# Patient Record
Sex: FEMALE | Race: OTHER | HISPANIC OR LATINO | URBAN - METROPOLITAN AREA
[De-identification: names, ages, dates, MRNs, and addresses within clinical notes are randomized per-mention and may not be internally consistent; named-entity substitution may affect disease eponyms.]

---

## 2021-10-22 ENCOUNTER — EMERGENCY (EMERGENCY)
Facility: HOSPITAL | Age: 71
LOS: 1 days | Discharge: HOME | End: 2021-10-22
Admitting: EMERGENCY MEDICINE
Payer: MEDICAID

## 2021-10-22 VITALS
SYSTOLIC BLOOD PRESSURE: 136 MMHG | OXYGEN SATURATION: 100 % | TEMPERATURE: 98 F | DIASTOLIC BLOOD PRESSURE: 76 MMHG | RESPIRATION RATE: 15 BRPM

## 2021-10-22 VITALS
DIASTOLIC BLOOD PRESSURE: 89 MMHG | SYSTOLIC BLOOD PRESSURE: 147 MMHG | HEART RATE: 80 BPM | OXYGEN SATURATION: 100 % | TEMPERATURE: 98 F | RESPIRATION RATE: 18 BRPM

## 2021-10-22 LAB
ALBUMIN SERPL ELPH-MCNC: 4.8 G/DL — SIGNIFICANT CHANGE UP (ref 3.3–5)
ALP SERPL-CCNC: 58 U/L — SIGNIFICANT CHANGE UP (ref 40–120)
ALT FLD-CCNC: 23 U/L — SIGNIFICANT CHANGE UP (ref 4–33)
ANION GAP SERPL CALC-SCNC: 10 MMOL/L — SIGNIFICANT CHANGE UP (ref 7–14)
APPEARANCE UR: CLEAR — SIGNIFICANT CHANGE UP
AST SERPL-CCNC: 22 U/L — SIGNIFICANT CHANGE UP (ref 4–32)
BASOPHILS # BLD AUTO: 0.04 K/UL — SIGNIFICANT CHANGE UP (ref 0–0.2)
BASOPHILS NFR BLD AUTO: 0.6 % — SIGNIFICANT CHANGE UP (ref 0–2)
BILIRUB SERPL-MCNC: 0.2 MG/DL — SIGNIFICANT CHANGE UP (ref 0.2–1.2)
BILIRUB UR-MCNC: NEGATIVE — SIGNIFICANT CHANGE UP
BUN SERPL-MCNC: 22 MG/DL — SIGNIFICANT CHANGE UP (ref 7–23)
CALCIUM SERPL-MCNC: 9.7 MG/DL — SIGNIFICANT CHANGE UP (ref 8.4–10.5)
CHLORIDE SERPL-SCNC: 101 MMOL/L — SIGNIFICANT CHANGE UP (ref 98–107)
CO2 SERPL-SCNC: 25 MMOL/L — SIGNIFICANT CHANGE UP (ref 22–31)
COLOR SPEC: SIGNIFICANT CHANGE UP
CREAT SERPL-MCNC: 0.92 MG/DL — SIGNIFICANT CHANGE UP (ref 0.5–1.3)
DIFF PNL FLD: NEGATIVE — SIGNIFICANT CHANGE UP
EOSINOPHIL # BLD AUTO: 0.16 K/UL — SIGNIFICANT CHANGE UP (ref 0–0.5)
EOSINOPHIL NFR BLD AUTO: 2.4 % — SIGNIFICANT CHANGE UP (ref 0–6)
GLUCOSE SERPL-MCNC: 90 MG/DL — SIGNIFICANT CHANGE UP (ref 70–99)
GLUCOSE UR QL: NEGATIVE — SIGNIFICANT CHANGE UP
HCT VFR BLD CALC: 42 % — SIGNIFICANT CHANGE UP (ref 34.5–45)
HGB BLD-MCNC: 12.7 G/DL — SIGNIFICANT CHANGE UP (ref 11.5–15.5)
IANC: 3.42 K/UL — SIGNIFICANT CHANGE UP (ref 1.5–8.5)
IMM GRANULOCYTES NFR BLD AUTO: 0.1 % — SIGNIFICANT CHANGE UP (ref 0–1.5)
KETONES UR-MCNC: NEGATIVE — SIGNIFICANT CHANGE UP
LEUKOCYTE ESTERASE UR-ACNC: NEGATIVE — SIGNIFICANT CHANGE UP
LYMPHOCYTES # BLD AUTO: 2.76 K/UL — SIGNIFICANT CHANGE UP (ref 1–3.3)
LYMPHOCYTES # BLD AUTO: 40.6 % — SIGNIFICANT CHANGE UP (ref 13–44)
MCHC RBC-ENTMCNC: 24.6 PG — LOW (ref 27–34)
MCHC RBC-ENTMCNC: 30.2 GM/DL — LOW (ref 32–36)
MCV RBC AUTO: 81.2 FL — SIGNIFICANT CHANGE UP (ref 80–100)
MONOCYTES # BLD AUTO: 0.41 K/UL — SIGNIFICANT CHANGE UP (ref 0–0.9)
MONOCYTES NFR BLD AUTO: 6 % — SIGNIFICANT CHANGE UP (ref 2–14)
NEUTROPHILS # BLD AUTO: 3.42 K/UL — SIGNIFICANT CHANGE UP (ref 1.8–7.4)
NEUTROPHILS NFR BLD AUTO: 50.3 % — SIGNIFICANT CHANGE UP (ref 43–77)
NITRITE UR-MCNC: NEGATIVE — SIGNIFICANT CHANGE UP
NRBC # BLD: 0 /100 WBCS — SIGNIFICANT CHANGE UP
NRBC # FLD: 0 K/UL — SIGNIFICANT CHANGE UP
PH UR: 6.5 — SIGNIFICANT CHANGE UP (ref 5–8)
PLATELET # BLD AUTO: 173 K/UL — SIGNIFICANT CHANGE UP (ref 150–400)
POTASSIUM SERPL-MCNC: 4.7 MMOL/L — SIGNIFICANT CHANGE UP (ref 3.5–5.3)
POTASSIUM SERPL-SCNC: 4.7 MMOL/L — SIGNIFICANT CHANGE UP (ref 3.5–5.3)
PROT SERPL-MCNC: 7.1 G/DL — SIGNIFICANT CHANGE UP (ref 6–8.3)
PROT UR-MCNC: NEGATIVE — SIGNIFICANT CHANGE UP
RBC # BLD: 5.17 M/UL — SIGNIFICANT CHANGE UP (ref 3.8–5.2)
RBC # FLD: 13.1 % — SIGNIFICANT CHANGE UP (ref 10.3–14.5)
SODIUM SERPL-SCNC: 136 MMOL/L — SIGNIFICANT CHANGE UP (ref 135–145)
SP GR SPEC: 1.01 — SIGNIFICANT CHANGE UP (ref 1–1.05)
UROBILINOGEN FLD QL: SIGNIFICANT CHANGE UP
WBC # BLD: 6.8 K/UL — SIGNIFICANT CHANGE UP (ref 3.8–10.5)
WBC # FLD AUTO: 6.8 K/UL — SIGNIFICANT CHANGE UP (ref 3.8–10.5)

## 2021-10-22 PROCEDURE — G1004: CPT

## 2021-10-22 PROCEDURE — 74176 CT ABD & PELVIS W/O CONTRAST: CPT | Mod: 26,ME

## 2021-10-22 PROCEDURE — 99284 EMERGENCY DEPT VISIT MOD MDM: CPT

## 2021-10-22 RX ORDER — LIDOCAINE 4 G/100G
1 CREAM TOPICAL ONCE
Refills: 0 | Status: COMPLETED | OUTPATIENT
Start: 2021-10-22 | End: 2021-10-22

## 2021-10-22 RX ORDER — ACETAMINOPHEN 500 MG
650 TABLET ORAL ONCE
Refills: 0 | Status: COMPLETED | OUTPATIENT
Start: 2021-10-22 | End: 2021-10-22

## 2021-10-22 RX ADMIN — LIDOCAINE 1 PATCH: 4 CREAM TOPICAL at 19:44

## 2021-10-22 RX ADMIN — Medication 650 MILLIGRAM(S): at 19:44

## 2021-10-22 NOTE — ED PROVIDER NOTE - PHYSICAL EXAMINATION
Vital signs reviewed.   CONSTITUTIONAL: Well-appearing; well-nourished; in no apparent distress. Non-toxic appearing.   HEAD: Normocephalic, atraumatic.  NECK Supple; non-tender  CARD: Normal S1, S2, rate regular   RESP: Normal chest excursion with respiration; breath sounds clear and equal bilaterally  ABD/GI: +discomfort to right flank with palpation, abdomen is soft, non-distended; non-tender  EXT/MS: no lumbar or spinal tenderness on exam, ROM full UE and LE, sensation grossly intact, ambulates steadily.   SKIN: Normal for age and race; warm; dry; good turgor; no apparent lesions or exudate noted.  NEURO: Awake, alert, oriented x 3

## 2021-10-22 NOTE — ED PROVIDER NOTE - PATIENT PORTAL LINK FT
You can access the FollowMyHealth Patient Portal offered by [facility] by registering at the following website: [email or web address]. By joining [employer]’s FollowMyHealth portal, you will also be able to view your health information using other applications (apps) compatible with our system.

## 2021-10-22 NOTE — ED PROVIDER NOTE - NS ED ROS FT
ROS:  GENERAL: No fever, no chills  CARDIAC: no chest pain, no palpitations  PULMONARY: no cough, no shortness of breath  GI: no abdominal pain, no nausea, no vomiting, no diarrhea, no constipation  : No dysuria, no frequency, no change in appearance, or odor of urine  SKIN: no rashes  NEURO: no headache, no weakness, no numbness, no bladder/bowel dysfunction, no saddle anesthesias  MSK: +right flank and right lower back pain, no neck pain, no pain radiation, no gait instability, no dec ROM

## 2021-10-22 NOTE — ED PROVIDER NOTE - CLINICAL SUMMARY MEDICAL DECISION MAKING FREE TEXT BOX
72 yo female presents c/o right flank and low back pain for 1 week. Patient overall well appearing, and exam notable for right flank discomfort. Concern for MSK pain vs kidney stone. Plan for labs, urinalysis, pain control, CT abd/pelvis, reassess

## 2021-10-22 NOTE — ED ADULT NURSE NOTE - OBJECTIVE STATEMENT
patient Alert & ox3, Arrives from home with son reports right sided upper back pain worse with any movement x 10 days. pt . evaluated by MD. Placed 20g angiocath Lt. AC., labs drawn and sent.  due meds given as per order. patient will be waiting for results, further evaluation and disposition.  made comfortable.will continue to monitor.

## 2021-10-22 NOTE — ED PROVIDER NOTE - NSFOLLOWUPINSTRUCTIONS_ED_ALL_ED_FT
Take tylenol as needed for pain.     Call to make an appointment for follow up with your primary care provider.     Return to the ER for any new, persistent, or worsening condition.

## 2021-10-22 NOTE — ED ADULT TRIAGE NOTE - CHIEF COMPLAINT QUOTE
Arrives from home with son ([phone]) reports right sided upper back pain worse with any movement x 10 days. Denies urinary symptoms associated. PMH HTN HLD DM2

## 2021-10-22 NOTE — ED PROVIDER NOTE - OBJECTIVE STATEMENT
72 yo female with PMH HTN, HLD, DM, and thyroid disease who presents to the ER c/o right sided flank/low back pain for 1 week. Pain started gradually without injury or trauma, and is worse with certain movements. Admits pain feels similar to when she had a kidney stone in the past. She took 1 tylenol earlier today without relief of pain. She presents for evaluation. She denies fever, chills, chest pain, SOB, n/v/d, rash, dysuria, frequency, hematuria, pain radiation, bladder/bowel dysfunction, saddle anesthesias, gait instability, or any other acute complaints.

## 2021-10-22 NOTE — ED ADULT NURSE NOTE - CHIEF COMPLAINT QUOTE
Arrives from home with son ([phone]) reports right sided upper back pain worse with any movement x 10 days. Denies urinary symptoms associated. PMH HTN HLD DM2

## 2022-12-09 ENCOUNTER — INPATIENT (INPATIENT)
Facility: HOSPITAL | Age: 72
LOS: 3 days | Discharge: HOME | DRG: 694 | End: 2022-12-13
Attending: INTERNAL MEDICINE | Admitting: INTERNAL MEDICINE
Payer: MEDICAID

## 2022-12-09 VITALS
DIASTOLIC BLOOD PRESSURE: 75 MMHG | WEIGHT: 164.02 LBS | RESPIRATION RATE: 18 BRPM | OXYGEN SATURATION: 97 % | TEMPERATURE: 98 F | HEART RATE: 88 BPM | HEIGHT: 62 IN | SYSTOLIC BLOOD PRESSURE: 153 MMHG

## 2022-12-09 DIAGNOSIS — N20.0 CALCULUS OF KIDNEY: ICD-10-CM

## 2022-12-09 DIAGNOSIS — E11.9 TYPE 2 DIABETES MELLITUS WITHOUT COMPLICATIONS: ICD-10-CM

## 2022-12-09 DIAGNOSIS — I10 ESSENTIAL (PRIMARY) HYPERTENSION: ICD-10-CM

## 2022-12-09 DIAGNOSIS — Z90.710 ACQUIRED ABSENCE OF BOTH CERVIX AND UTERUS: Chronic | ICD-10-CM

## 2022-12-09 DIAGNOSIS — R31.9 HEMATURIA, UNSPECIFIED: ICD-10-CM

## 2022-12-09 DIAGNOSIS — R10.9 UNSPECIFIED ABDOMINAL PAIN: ICD-10-CM

## 2022-12-09 DIAGNOSIS — E03.9 HYPOTHYROIDISM, UNSPECIFIED: ICD-10-CM

## 2022-12-09 LAB
ALBUMIN SERPL ELPH-MCNC: 4.6 G/DL — SIGNIFICANT CHANGE UP (ref 3.3–5)
ALP SERPL-CCNC: 58 U/L — SIGNIFICANT CHANGE UP (ref 40–120)
ALT FLD-CCNC: 20 U/L — SIGNIFICANT CHANGE UP (ref 10–45)
ANION GAP SERPL CALC-SCNC: 14 MMOL/L — SIGNIFICANT CHANGE UP (ref 5–17)
APPEARANCE UR: ABNORMAL
APPEARANCE UR: CLEAR — SIGNIFICANT CHANGE UP
AST SERPL-CCNC: 17 U/L — SIGNIFICANT CHANGE UP (ref 10–40)
BACTERIA # UR AUTO: ABNORMAL
BACTERIA # UR AUTO: NEGATIVE — SIGNIFICANT CHANGE UP
BASE EXCESS BLDV CALC-SCNC: -0.8 MMOL/L — SIGNIFICANT CHANGE UP (ref -2–3)
BASE EXCESS BLDV CALC-SCNC: 0.3 MMOL/L — SIGNIFICANT CHANGE UP (ref -2–3)
BASOPHILS # BLD AUTO: 0.02 K/UL — SIGNIFICANT CHANGE UP (ref 0–0.2)
BASOPHILS NFR BLD AUTO: 0.2 % — SIGNIFICANT CHANGE UP (ref 0–2)
BILIRUB SERPL-MCNC: 0.4 MG/DL — SIGNIFICANT CHANGE UP (ref 0.2–1.2)
BILIRUB UR-MCNC: NEGATIVE — SIGNIFICANT CHANGE UP
BILIRUB UR-MCNC: NEGATIVE — SIGNIFICANT CHANGE UP
BUN SERPL-MCNC: 19 MG/DL — SIGNIFICANT CHANGE UP (ref 7–23)
CA-I SERPL-SCNC: 1.17 MMOL/L — SIGNIFICANT CHANGE UP (ref 1.15–1.33)
CA-I SERPL-SCNC: 1.19 MMOL/L — SIGNIFICANT CHANGE UP (ref 1.15–1.33)
CALCIUM SERPL-MCNC: 9.4 MG/DL — SIGNIFICANT CHANGE UP (ref 8.4–10.5)
CHLORIDE BLDV-SCNC: 100 MMOL/L — SIGNIFICANT CHANGE UP (ref 96–108)
CHLORIDE BLDV-SCNC: 103 MMOL/L — SIGNIFICANT CHANGE UP (ref 96–108)
CHLORIDE SERPL-SCNC: 103 MMOL/L — SIGNIFICANT CHANGE UP (ref 96–108)
CO2 BLDV-SCNC: 26 MMOL/L — SIGNIFICANT CHANGE UP (ref 22–26)
CO2 BLDV-SCNC: 28 MMOL/L — HIGH (ref 22–26)
CO2 SERPL-SCNC: 25 MMOL/L — SIGNIFICANT CHANGE UP (ref 22–31)
COLOR SPEC: SIGNIFICANT CHANGE UP
COLOR SPEC: YELLOW — SIGNIFICANT CHANGE UP
CREAT SERPL-MCNC: 1.15 MG/DL — SIGNIFICANT CHANGE UP (ref 0.5–1.3)
DIFF PNL FLD: ABNORMAL
DIFF PNL FLD: ABNORMAL
EGFR: 50 ML/MIN/1.73M2 — LOW
EOSINOPHIL # BLD AUTO: 0.09 K/UL — SIGNIFICANT CHANGE UP (ref 0–0.5)
EOSINOPHIL NFR BLD AUTO: 1 % — SIGNIFICANT CHANGE UP (ref 0–6)
EPI CELLS # UR: 1 /HPF — SIGNIFICANT CHANGE UP
EPI CELLS # UR: 18 /HPF — HIGH
FLUAV AG NPH QL: SIGNIFICANT CHANGE UP
FLUBV AG NPH QL: SIGNIFICANT CHANGE UP
GAS PNL BLDV: 135 MMOL/L — LOW (ref 136–145)
GAS PNL BLDV: 136 MMOL/L — SIGNIFICANT CHANGE UP (ref 136–145)
GAS PNL BLDV: SIGNIFICANT CHANGE UP
GLUCOSE BLDC GLUCOMTR-MCNC: 109 MG/DL — HIGH (ref 70–99)
GLUCOSE BLDC GLUCOMTR-MCNC: 134 MG/DL — HIGH (ref 70–99)
GLUCOSE BLDV-MCNC: 130 MG/DL — HIGH (ref 70–99)
GLUCOSE BLDV-MCNC: 210 MG/DL — HIGH (ref 70–99)
GLUCOSE SERPL-MCNC: 209 MG/DL — HIGH (ref 70–99)
GLUCOSE UR QL: NEGATIVE — SIGNIFICANT CHANGE UP
GLUCOSE UR QL: NEGATIVE — SIGNIFICANT CHANGE UP
HCO3 BLDV-SCNC: 24 MMOL/L — SIGNIFICANT CHANGE UP (ref 22–29)
HCO3 BLDV-SCNC: 27 MMOL/L — SIGNIFICANT CHANGE UP (ref 22–29)
HCT VFR BLD CALC: 36.2 % — SIGNIFICANT CHANGE UP (ref 34.5–45)
HCT VFR BLD CALC: 41 % — SIGNIFICANT CHANGE UP (ref 34.5–45)
HCT VFR BLDA CALC: 34 % — LOW (ref 34.5–46.5)
HCT VFR BLDA CALC: 36 % — SIGNIFICANT CHANGE UP (ref 34.5–46.5)
HGB BLD CALC-MCNC: 11.3 G/DL — LOW (ref 11.7–16.1)
HGB BLD CALC-MCNC: 12 G/DL — SIGNIFICANT CHANGE UP (ref 11.7–16.1)
HGB BLD-MCNC: 11.2 G/DL — LOW (ref 11.5–15.5)
HGB BLD-MCNC: 12.7 G/DL — SIGNIFICANT CHANGE UP (ref 11.5–15.5)
HYALINE CASTS # UR AUTO: 0 /LPF — SIGNIFICANT CHANGE UP (ref 0–2)
HYALINE CASTS # UR AUTO: 8 /LPF — HIGH (ref 0–2)
IMM GRANULOCYTES NFR BLD AUTO: 0.4 % — SIGNIFICANT CHANGE UP (ref 0–0.9)
KETONES UR-MCNC: NEGATIVE — SIGNIFICANT CHANGE UP
KETONES UR-MCNC: NEGATIVE — SIGNIFICANT CHANGE UP
LACTATE BLDV-MCNC: 2.3 MMOL/L — HIGH (ref 0.5–2)
LACTATE BLDV-MCNC: 2.7 MMOL/L — HIGH (ref 0.5–2)
LEUKOCYTE ESTERASE UR-ACNC: ABNORMAL
LEUKOCYTE ESTERASE UR-ACNC: NEGATIVE — SIGNIFICANT CHANGE UP
LIDOCAIN IGE QN: 444 U/L — HIGH (ref 7–60)
LYMPHOCYTES # BLD AUTO: 1.43 K/UL — SIGNIFICANT CHANGE UP (ref 1–3.3)
LYMPHOCYTES # BLD AUTO: 15.7 % — SIGNIFICANT CHANGE UP (ref 13–44)
MCHC RBC-ENTMCNC: 24.5 PG — LOW (ref 27–34)
MCHC RBC-ENTMCNC: 24.5 PG — LOW (ref 27–34)
MCHC RBC-ENTMCNC: 30.9 GM/DL — LOW (ref 32–36)
MCHC RBC-ENTMCNC: 31 GM/DL — LOW (ref 32–36)
MCV RBC AUTO: 79 FL — LOW (ref 80–100)
MCV RBC AUTO: 79.2 FL — LOW (ref 80–100)
MONOCYTES # BLD AUTO: 0.35 K/UL — SIGNIFICANT CHANGE UP (ref 0–0.9)
MONOCYTES NFR BLD AUTO: 3.8 % — SIGNIFICANT CHANGE UP (ref 2–14)
NEUTROPHILS # BLD AUTO: 7.2 K/UL — SIGNIFICANT CHANGE UP (ref 1.8–7.4)
NEUTROPHILS NFR BLD AUTO: 78.9 % — HIGH (ref 43–77)
NITRITE UR-MCNC: NEGATIVE — SIGNIFICANT CHANGE UP
NITRITE UR-MCNC: NEGATIVE — SIGNIFICANT CHANGE UP
NRBC # BLD: 0 /100 WBCS — SIGNIFICANT CHANGE UP (ref 0–0)
NRBC # BLD: 0 /100 WBCS — SIGNIFICANT CHANGE UP (ref 0–0)
PCO2 BLDV: 41 MMHG — SIGNIFICANT CHANGE UP (ref 39–42)
PCO2 BLDV: 51 MMHG — HIGH (ref 39–42)
PH BLDV: 7.33 — SIGNIFICANT CHANGE UP (ref 7.32–7.43)
PH BLDV: 7.38 — SIGNIFICANT CHANGE UP (ref 7.32–7.43)
PH UR: 6 — SIGNIFICANT CHANGE UP (ref 5–8)
PH UR: 6.5 — SIGNIFICANT CHANGE UP (ref 5–8)
PLATELET # BLD AUTO: 142 K/UL — LOW (ref 150–400)
PLATELET # BLD AUTO: 167 K/UL — SIGNIFICANT CHANGE UP (ref 150–400)
PO2 BLDV: 24 MMHG — LOW (ref 25–45)
PO2 BLDV: 47 MMHG — HIGH (ref 25–45)
POTASSIUM BLDV-SCNC: 4 MMOL/L — SIGNIFICANT CHANGE UP (ref 3.5–5.1)
POTASSIUM BLDV-SCNC: 4.3 MMOL/L — SIGNIFICANT CHANGE UP (ref 3.5–5.1)
POTASSIUM SERPL-MCNC: 4.2 MMOL/L — SIGNIFICANT CHANGE UP (ref 3.5–5.3)
POTASSIUM SERPL-SCNC: 4.2 MMOL/L — SIGNIFICANT CHANGE UP (ref 3.5–5.3)
PROT SERPL-MCNC: 7 G/DL — SIGNIFICANT CHANGE UP (ref 6–8.3)
PROT UR-MCNC: ABNORMAL
PROT UR-MCNC: NEGATIVE — SIGNIFICANT CHANGE UP
RBC # BLD: 4.57 M/UL — SIGNIFICANT CHANGE UP (ref 3.8–5.2)
RBC # BLD: 5.19 M/UL — SIGNIFICANT CHANGE UP (ref 3.8–5.2)
RBC # FLD: 13.2 % — SIGNIFICANT CHANGE UP (ref 10.3–14.5)
RBC # FLD: 13.2 % — SIGNIFICANT CHANGE UP (ref 10.3–14.5)
RBC CASTS # UR COMP ASSIST: 110 /HPF — HIGH (ref 0–4)
RBC CASTS # UR COMP ASSIST: 142 /HPF — HIGH (ref 0–4)
RSV RNA NPH QL NAA+NON-PROBE: SIGNIFICANT CHANGE UP
SAO2 % BLDV: 30.8 % — LOW (ref 67–88)
SAO2 % BLDV: 81.4 % — SIGNIFICANT CHANGE UP (ref 67–88)
SARS-COV-2 RNA SPEC QL NAA+PROBE: SIGNIFICANT CHANGE UP
SODIUM SERPL-SCNC: 142 MMOL/L — SIGNIFICANT CHANGE UP (ref 135–145)
SP GR SPEC: 1.02 — SIGNIFICANT CHANGE UP (ref 1.01–1.02)
SP GR SPEC: 1.05 — HIGH (ref 1.01–1.02)
UROBILINOGEN FLD QL: NEGATIVE — SIGNIFICANT CHANGE UP
UROBILINOGEN FLD QL: NEGATIVE — SIGNIFICANT CHANGE UP
WBC # BLD: 5.33 K/UL — SIGNIFICANT CHANGE UP (ref 3.8–10.5)
WBC # BLD: 9.13 K/UL — SIGNIFICANT CHANGE UP (ref 3.8–10.5)
WBC # FLD AUTO: 5.33 K/UL — SIGNIFICANT CHANGE UP (ref 3.8–10.5)
WBC # FLD AUTO: 9.13 K/UL — SIGNIFICANT CHANGE UP (ref 3.8–10.5)
WBC UR QL: 4 /HPF — SIGNIFICANT CHANGE UP (ref 0–5)
WBC UR QL: 44 /HPF — HIGH (ref 0–5)

## 2022-12-09 PROCEDURE — 74176 CT ABD & PELVIS W/O CONTRAST: CPT | Mod: 26,MA

## 2022-12-09 PROCEDURE — 99285 EMERGENCY DEPT VISIT HI MDM: CPT

## 2022-12-09 PROCEDURE — 74178 CT ABD&PLV WO CNTR FLWD CNTR: CPT | Mod: 26,MA

## 2022-12-09 PROCEDURE — 93010 ELECTROCARDIOGRAM REPORT: CPT

## 2022-12-09 PROCEDURE — 71046 X-RAY EXAM CHEST 2 VIEWS: CPT | Mod: 26

## 2022-12-09 RX ORDER — INSULIN LISPRO 100/ML
VIAL (ML) SUBCUTANEOUS
Refills: 0 | Status: DISCONTINUED | OUTPATIENT
Start: 2022-12-09 | End: 2022-12-13

## 2022-12-09 RX ORDER — GLUCAGON INJECTION, SOLUTION 0.5 MG/.1ML
1 INJECTION, SOLUTION SUBCUTANEOUS ONCE
Refills: 0 | Status: DISCONTINUED | OUTPATIENT
Start: 2022-12-09 | End: 2022-12-13

## 2022-12-09 RX ORDER — DEXTROSE 50 % IN WATER 50 %
25 SYRINGE (ML) INTRAVENOUS ONCE
Refills: 0 | Status: DISCONTINUED | OUTPATIENT
Start: 2022-12-09 | End: 2022-12-13

## 2022-12-09 RX ORDER — MORPHINE SULFATE 50 MG/1
2 CAPSULE, EXTENDED RELEASE ORAL ONCE
Refills: 0 | Status: DISCONTINUED | OUTPATIENT
Start: 2022-12-09 | End: 2022-12-09

## 2022-12-09 RX ORDER — ACETAMINOPHEN 500 MG
1000 TABLET ORAL ONCE
Refills: 0 | Status: COMPLETED | OUTPATIENT
Start: 2022-12-09 | End: 2022-12-09

## 2022-12-09 RX ORDER — SODIUM CHLORIDE 9 MG/ML
1000 INJECTION, SOLUTION INTRAVENOUS ONCE
Refills: 0 | Status: COMPLETED | OUTPATIENT
Start: 2022-12-09 | End: 2022-12-09

## 2022-12-09 RX ORDER — INFLUENZA VIRUS VACCINE 15; 15; 15; 15 UG/.5ML; UG/.5ML; UG/.5ML; UG/.5ML
0.7 SUSPENSION INTRAMUSCULAR ONCE
Refills: 0 | Status: COMPLETED | OUTPATIENT
Start: 2022-12-09 | End: 2022-12-13

## 2022-12-09 RX ORDER — SODIUM CHLORIDE 9 MG/ML
1000 INJECTION INTRAMUSCULAR; INTRAVENOUS; SUBCUTANEOUS ONCE
Refills: 0 | Status: COMPLETED | OUTPATIENT
Start: 2022-12-09 | End: 2022-12-09

## 2022-12-09 RX ORDER — SODIUM CHLORIDE 9 MG/ML
1000 INJECTION, SOLUTION INTRAVENOUS
Refills: 0 | Status: DISCONTINUED | OUTPATIENT
Start: 2022-12-09 | End: 2022-12-13

## 2022-12-09 RX ORDER — KETOROLAC TROMETHAMINE 30 MG/ML
15 SYRINGE (ML) INJECTION ONCE
Refills: 0 | Status: DISCONTINUED | OUTPATIENT
Start: 2022-12-09 | End: 2022-12-09

## 2022-12-09 RX ORDER — DEXTROSE 50 % IN WATER 50 %
15 SYRINGE (ML) INTRAVENOUS ONCE
Refills: 0 | Status: DISCONTINUED | OUTPATIENT
Start: 2022-12-09 | End: 2022-12-13

## 2022-12-09 RX ORDER — LEVOTHYROXINE SODIUM 125 MCG
75 TABLET ORAL DAILY
Refills: 0 | Status: DISCONTINUED | OUTPATIENT
Start: 2022-12-09 | End: 2022-12-13

## 2022-12-09 RX ORDER — SODIUM CHLORIDE 9 MG/ML
1000 INJECTION INTRAMUSCULAR; INTRAVENOUS; SUBCUTANEOUS
Refills: 0 | Status: DISCONTINUED | OUTPATIENT
Start: 2022-12-09 | End: 2022-12-12

## 2022-12-09 RX ORDER — ONDANSETRON 8 MG/1
4 TABLET, FILM COATED ORAL ONCE
Refills: 0 | Status: COMPLETED | OUTPATIENT
Start: 2022-12-09 | End: 2022-12-09

## 2022-12-09 RX ORDER — SIMVASTATIN 20 MG/1
20 TABLET, FILM COATED ORAL AT BEDTIME
Refills: 0 | Status: DISCONTINUED | OUTPATIENT
Start: 2022-12-09 | End: 2022-12-13

## 2022-12-09 RX ORDER — DEXTROSE 50 % IN WATER 50 %
12.5 SYRINGE (ML) INTRAVENOUS ONCE
Refills: 0 | Status: DISCONTINUED | OUTPATIENT
Start: 2022-12-09 | End: 2022-12-13

## 2022-12-09 RX ORDER — FAMOTIDINE 10 MG/ML
20 INJECTION INTRAVENOUS
Refills: 0 | Status: DISCONTINUED | OUTPATIENT
Start: 2022-12-09 | End: 2022-12-13

## 2022-12-09 RX ADMIN — MORPHINE SULFATE 2 MILLIGRAM(S): 50 CAPSULE, EXTENDED RELEASE ORAL at 09:42

## 2022-12-09 RX ADMIN — SIMVASTATIN 20 MILLIGRAM(S): 20 TABLET, FILM COATED ORAL at 21:05

## 2022-12-09 RX ADMIN — SODIUM CHLORIDE 1000 MILLILITER(S): 9 INJECTION INTRAMUSCULAR; INTRAVENOUS; SUBCUTANEOUS at 16:54

## 2022-12-09 RX ADMIN — SODIUM CHLORIDE 1000 MILLILITER(S): 9 INJECTION, SOLUTION INTRAVENOUS at 07:36

## 2022-12-09 RX ADMIN — SODIUM CHLORIDE 1000 MILLILITER(S): 9 INJECTION, SOLUTION INTRAVENOUS at 09:19

## 2022-12-09 RX ADMIN — MORPHINE SULFATE 2 MILLIGRAM(S): 50 CAPSULE, EXTENDED RELEASE ORAL at 09:13

## 2022-12-09 RX ADMIN — ONDANSETRON 4 MILLIGRAM(S): 8 TABLET, FILM COATED ORAL at 07:37

## 2022-12-09 RX ADMIN — Medication 400 MILLIGRAM(S): at 22:15

## 2022-12-09 RX ADMIN — MORPHINE SULFATE 2 MILLIGRAM(S): 50 CAPSULE, EXTENDED RELEASE ORAL at 07:39

## 2022-12-09 RX ADMIN — Medication 15 MILLIGRAM(S): at 11:00

## 2022-12-09 RX ADMIN — Medication 1000 MILLIGRAM(S): at 23:10

## 2022-12-09 RX ADMIN — MORPHINE SULFATE 2 MILLIGRAM(S): 50 CAPSULE, EXTENDED RELEASE ORAL at 11:00

## 2022-12-09 RX ADMIN — SODIUM CHLORIDE 1000 MILLILITER(S): 9 INJECTION, SOLUTION INTRAVENOUS at 09:13

## 2022-12-09 RX ADMIN — Medication 15 MILLIGRAM(S): at 09:36

## 2022-12-09 RX ADMIN — SODIUM CHLORIDE 100 MILLILITER(S): 9 INJECTION INTRAMUSCULAR; INTRAVENOUS; SUBCUTANEOUS at 22:05

## 2022-12-09 RX ADMIN — SODIUM CHLORIDE 1000 MILLILITER(S): 9 INJECTION, SOLUTION INTRAVENOUS at 10:00

## 2022-12-09 NOTE — PATIENT PROFILE ADULT - FALL HARM RISK - HARM RISK INTERVENTIONS

## 2022-12-09 NOTE — H&P ADULT - NSHPPHYSICALEXAM_GEN_ALL_CORE
PHYSICAL EXAM: vital signs noted on [place]  in no apparent distress  HEENT: SOCORRO EOMI  Neck: Supple, no JVD, no thyromegaly  Lungs: no wheeze, no crackles  CVS: S1 S2 no M/R/G  Abdomen: no tenderness, no organomegaly, BS present  Neuro: AO x 3 no focal weakness, no sensory abnormalities  Psych: appropriate affect  Skin: warm, dry  Ext: no cyanosis or clubbing, no edema  Msk: no joint swelling or deformities  Back: no CVA tenderness, no kyphosis/scoliosis   right CVA tenderness +

## 2022-12-09 NOTE — ED PROVIDER NOTE - CLINICAL SUMMARY MEDICAL DECISION MAKING FREE TEXT BOX
Tony Lopez, XUN[age]1-75-year-old female with a past medical history of renal stones, hypertension, DM, HLD, hypothyroid on 81 mg aspirin daily, presenting to ED for evaluation of right flank pain and hematuria for the past 2 days.  Flank pain started this morning.  Feels similar to prior kidney stones.  Vital stable on arrival.  Exam significant for right CVA tenderness.  Plan to rule out renal stone, infected stone, pyelo.  Low suspicion for aorta or vascular pathology, ACS, pneumonia.  Dispo pending work-up.  Plan to treat symptomatically and reeval

## 2022-12-09 NOTE — ED ADULT NURSE NOTE - NSIMPLEMENTINTERV_GEN_ALL_ED
Implemented All Universal Safety Interventions:  [place] to call system. Call bell, personal items and telephone within reach. Instruct patient to call for assistance. Room bathroom lighting operational. Non-slip footwear when patient is off stretcher. Physically safe environment: no spills, clutter or unnecessary equipment. Stretcher in lowest position, wheels locked, appropriate side rails in place.

## 2022-12-09 NOTE — ED ADULT TRIAGE NOTE - CHIEF COMPLAINT QUOTE
pt c/o hematuria x 2 days and R flank pain since 3AM; pt denies burning/pain with urination, pt reports h/o kidney stones

## 2022-12-09 NOTE — H&P ADULT - PROBLEM SELECTOR PLAN 1
likely secondary to right kidney stone causing ureteric bleeding and clot  urology input noted  IV hydration   pain control

## 2022-12-09 NOTE — ED PROVIDER NOTE - OBJECTIVE STATEMENT
Tony Lopez, LBV[age]9-55-year-old female with a past medical history of kidney stones, HLD, HTN, DM, hypothyroid, presents to ED for evaluation of right-sided flank pain that woke her up from sleep around 3 AM this morning.  Patient reports for the past 2 days she has had painless hematuria.  She was evaluated by her PCP yesterday and was advised to have a outpatient ultrasound of the kidneys or bladder performed.  This was scheduled for next week and the ultrasound has not yet been performed.  Patient reports the pain feels very similar to previous kidney stones.  Took Tylenol 1000 mg at 3 AM prior to coming in.  She notes nausea and one episode of vomiting.  Denies any fever, chills, chest pain, shortness of breath, cough, numbness, tingling, abdominal pain.  Abdominal surgery history includes hysterectomy.  Renal stones have passed on their own in the past.  patient does not use tobacco.  Patient takes daily aspirin 81 mg.  No other blood thinners.  PCP–Dr. Mckay

## 2022-12-09 NOTE — H&P ADULT - HISTORY OF PRESENT ILLNESS
Patient is a 72y Female who presented with 1 day of right flank pain radiating to the R groin.  Denies fever, chills, N/V/D, dysuria or urgency or frequency.  c/o 1 episode of gross hematuria today without clots.  Currently pain is controlled and last voided urine was clear.  + history of kidney stones, saw a urologist in [facility] [place] many years ago

## 2022-12-09 NOTE — ED PROVIDER NOTE - PROGRESS NOTE DETAILS
ap- pt w/ worsening pain, awaiting urine results, and ct results will give additional results Tony Lopez, PGY-3- urology evaluated pt, requested additional imaging. clot seen within R renal collecting system. Per uro, tx is iv hydration. Pt to be admitted to medicine. Family and pt updated. Lipase also elevated, unclear etiology, unlikely pancreatitis.

## 2022-12-09 NOTE — ED PROVIDER NOTE - NSICDXPASTMEDICALHX_GEN_ALL_CORE_FT
PAST MEDICAL HISTORY:  DM (diabetes mellitus)     HLD (hyperlipidemia)     HTN (hypertension)     Hypothyroid     Kidney stone

## 2022-12-09 NOTE — H&P ADULT - ASSESSMENT
72y Female who presented with 1 day of right flank pain radiating to the R groin diagnosed with right ureteric likely clot admitted for pain management and treatment

## 2022-12-09 NOTE — ED PROVIDER NOTE - ATTENDING CONTRIBUTION TO CARE
72 F w/ prior hx of kidney stones, HTN, DM, HLD, hypothyroidism, here w/ R flank pain started at 3 AM w/ hematuria that started yesterday, pt w/ similar sx when she had prior kidney stone in the past. pt w/ no prior abd surgeries. no bulging, no fevers, no chills, +vomiting prior to arrival to the ER non bloody, pt refused medical translation wants daughter matheus to translate. Pt aaox3, has dry mucus membranes, she has R flank pain w/ mild R mid abd tenderness, no buldging, no bruising on the abdomen, plan for labs ct scan, and reassessment, po chal, findings c/f possible pancreatitis, vs nephrolithiasis vs bowel obstruction,

## 2022-12-09 NOTE — ED ADULT NURSE NOTE - OBJECTIVE STATEMENT
72F aaox4 ambulatory, independent with h/o HTN DM HLD and Hypothyroid, p/w c/o Rt Flank pain accompanied by Hematuria started yesterday. Patient also reports nausea and vomited 2x PTA in the ED. Patient denies any chills or fever, abd pain, diarrhea or constipation, denies any dysuria. Pt took 1g Tylenol 2 hours ago PTA in the ED.  RT AC 18g Inserted, labs drawn, labs sent pending results.

## 2022-12-09 NOTE — ED ADULT NURSE REASSESSMENT NOTE - NS ED NURSE REASSESS COMMENT FT1
IVF LR 1L started, pain meds given. Plan of care explained to pt and family, patient pending CT scan to r/o Kidney stone.
Patient remains stable while in the ED, pain subsided, seen and evaluated by Urologist. Patient admitted to medicine for Renal hematuria awaiting bed. VS TRINH.
Patient remains stable while in the ED, VS WDL. Pain subsided, CT scan and labs resulted. Patient pending Urology consult.

## 2022-12-09 NOTE — CONSULT NOTE ADULT - ASSESSMENT
Right flank pain and hematuria   - Right flank pain and hematuria, unknown source, H/H stable, patient comfortable  - encourage fluids  - urine culture  - pain control  - will need diagnostic ureteroscopy as outpatient   - f/u with Dr. Mena [phone] Right flank pain and hematuria, unknown source, H/H stable, patient comfortable  - encourage fluids  - urine culture  - pain control  - will need diagnostic ureteroscopy as outpatient   - f/u with Dr. Mena [phone]  - D/w Dr. Culp

## 2022-12-09 NOTE — H&P ADULT - NSHPREVIEWOFSYSTEMS_GEN_ALL_CORE
Gen: no loss of wt no loss of appetite  ENT: no dizziness no hearing loss  Ophth: no blurring of vision no loss of vision  Resp: No cough no sputum production  CVS: No chest pain no palpitations no orthopnea  GI: no nausea, vomiting or diarrhea   : see above HPI   Endo: no polyuria no excessive sweating  Neuro: no weakness no paresthesias  Heme: No petechiae no easy bruising  Msk: No joint pain no swelling  Skin: No rash no itching

## 2022-12-09 NOTE — CONSULT NOTE ADULT - SUBJECTIVE AND OBJECTIVE BOX
HPI:  Patient is a 72y Female who presented with    PAST MEDICAL & SURGICAL HISTORY:  HTN (hypertension)      HLD (hyperlipidemia)      DM (diabetes mellitus)      Kidney stone      Hypothyroid      H/O: hysterectomy        FAMILY HISTORY:   No known  malignancy   SOCIAL HISTORY: Retired   Tobacco hx: smoker/nonsmoker   MEDICATIONS  (STANDING):    MEDICATIONS  (PRN):    Allergies    No Known Allergies    Intolerances        REVIEW OF SYSTEMS: Pertinent positives and negatives as stated in HPI, otherwise negative    Vital signs  T(C): 36.6 (22 @ 09:11), Max: 36.8 (22 @ 06:50)  HR: 78 (22 @ 11:44)  BP: 167/73 (22 @ 11:44)  SpO2: 100% (22 @ 11:44)  Wt(kg): --    Physical Exam  Gen: NAD  HEENT: normocephalic, atraumatic, no scleral icterus  Pulm:  No respiratory distress, no subcostal retractions, no accessory muscle use   CV:  RRR,  no JVD  Abd: Soft, NT, ND, no rebound tenderness or guarding  : Voided urine clear yellow  MSK:  Mild R CVAT  NEURO: A&Ox3  SKIN: warm, dry, no rash.    LABS:         @ 07:41    WBC 9.13  / Hct 41.0  / SCr 1.15         142  |  103  |  19  ----------------------------<  209<H>  4.2   |  25  |  1.15    Ca    9.4      09 Dec 2022 07:41    TPro  7.0  /  Alb  4.6  /  TBili  0.4  /  DBili  x   /  AST  17  /  ALT  20  /  AlkPhos  58        Urinalysis Basic - ( 09 Dec 2022 11:20 )    Color: Yellow / Appearance: Slightly Turbid / S.024 / pH: x  Gluc: x / Ketone: Negative  / Bili: Negative / Urobili: Negative   Blood: x / Protein: 30 mg/dL / Nitrite: Negative   Leuk Esterase: Large / RBC: 142 /hpf / WBC 44 /HPF   Sq Epi: x / Non Sq Epi: 18 /hpf / Bacteria: Moderate        Urine Cx:   Blood Cx:    Radiology:  CT Urogram:    HPI:  Patient is a 72y Female who presented with 1 day of right flank pain radiating to the R groin.  Denies fever, chills, N/V/D, dysuria or urgency or frequency.  c/o 1 episode of gross hematuria today without clots.  Currently pain is controlled and last voided urine was clear.  + history of kidney stones, saw a urologist in [place] many years ago.     PAST MEDICAL & SURGICAL HISTORY:  HTN (hypertension)      HLD (hyperlipidemia)      DM (diabetes mellitus)      Kidney stone      Hypothyroid      H/O: hysterectomy        FAMILY HISTORY:   No known  malignancy     SOCIAL HISTORY: Retired   Tobacco hx: nonsmoker     MEDICATIONS  (STANDING):  unknown       MEDICATIONS  (PRN):    Allergies    No Known Allergies    Intolerances        REVIEW OF SYSTEMS: Pertinent positives and negatives as stated in HPI, otherwise negative    Vital signs  T(C): 36.6 (22 @ 09:11), Max: 36.8 (22 @ 06:50)  HR: 78 (22 @ 11:44)  BP: 167/73 (22 @ 11:44)  SpO2: 100% (22 @ 11:44)  Wt(kg): --    Physical Exam  Gen: NAD  HEENT: normocephalic, atraumatic, no scleral icterus  Pulm:  No respiratory distress, no subcostal retractions, no accessory muscle use   CV:  RRR,  no JVD  Abd: Soft, NT, ND, no rebound tenderness or guarding  : Voided urine clear yellow  MSK:  Mild R CVAT  NEURO: A&Ox3  SKIN: warm, dry, no rash.    LABS:         @ 07:41    WBC 9.13  / Hct 41.0  / SCr 1.15         142  |  103  |  19  ----------------------------<  209<H>  4.2   |  25  |  1.15    Ca    9.4      09 Dec 2022 07:41    TPro  7.0  /  Alb  4.6  /  TBili  0.4  /  DBili  x   /  AST  17  /  ALT  20  /  AlkPhos  58        Urinalysis Basic - ( 09 Dec 2022 11:20 )    Color: Yellow / Appearance: Slightly Turbid / S.024 / pH: x  Gluc: x / Ketone: Negative  / Bili: Negative / Urobili: Negative   Blood: x / Protein: 30 mg/dL / Nitrite: Negative   Leuk Esterase: Large / RBC: 142 /hpf / WBC 44 /HPF   Sq Epi: x / Non Sq Epi: 18 /hpf / Bacteria: Moderate        Urine Cx:   Blood Cx:    Radiology:    < from: CT Abdomen and Pelvis No Cont (22 @ 08:26) >    IMPRESSION:    Mild right hydronephrosis with hemorrhage in the renal collecting system   and proximal ureter. Associated hyperdense focus within the interpolar   region of the right kidney may represent a ruptured hemorrhagic cyst into   the collecting system. An underlying mass lesion is not excluded. CT   urogram is recommended for further evaluation.  ]  Small bilateral nonobstructing renal calculi and additional calculi   likely associated with renal cysts.    < end of copied text >        CT Urogram:    HPI:  Patient is a 72y Female who presented with 1 day of right flank pain radiating to the R groin.  Denies fever, chills, N/V/D, dysuria or urgency or frequency.  c/o 1 episode of gross hematuria today without clots.  Currently pain is controlled and last voided urine was clear.  + history of kidney stones, saw a urologist in [place] many years ago.     PAST MEDICAL & SURGICAL HISTORY:  HTN (hypertension)      HLD (hyperlipidemia)      DM (diabetes mellitus)      Kidney stone      Hypothyroid      H/O: hysterectomy        FAMILY HISTORY:   No known  malignancy     SOCIAL HISTORY: Retired   Tobacco hx: nonsmoker     MEDICATIONS  (STANDING):  unknown       MEDICATIONS  (PRN):    Allergies    No Known Allergies    Intolerances        REVIEW OF SYSTEMS: Pertinent positives and negatives as stated in HPI, otherwise negative    Vital signs  T(C): 36.6 (22 @ 09:11), Max: 36.8 (22 @ 06:50)  HR: 78 (22 @ 11:44)  BP: 167/73 (22 @ 11:44)  SpO2: 100% (22 @ 11:44)  Wt(kg): --    Physical Exam  Gen: NAD  HEENT: normocephalic, atraumatic, no scleral icterus  Pulm:  No respiratory distress, no subcostal retractions, no accessory muscle use   CV:  RRR,  no JVD  Abd: Soft, NT, ND, no rebound tenderness or guarding  : Voided urine clear yellow  MSK:  Mild R CVAT  NEURO: A&Ox3  SKIN: warm, dry, no rash.    LABS:         @ 07:41    WBC 9.13  / Hct 41.0  / SCr 1.15         142  |  103  |  19  ----------------------------<  209<H>  4.2   |  25  |  1.15    Ca    9.4      09 Dec 2022 07:41    TPro  7.0  /  Alb  4.6  /  TBili  0.4  /  DBili  x   /  AST  17  /  ALT  20  /  AlkPhos  58        Urinalysis Basic - ( 09 Dec 2022 11:20 )    Color: Yellow / Appearance: Slightly Turbid / S.024 / pH: x  Gluc: x / Ketone: Negative  / Bili: Negative / Urobili: Negative   Blood: x / Protein: 30 mg/dL / Nitrite: Negative   Leuk Esterase: Large / RBC: 142 /hpf / WBC 44 /HPF   Sq Epi: x / Non Sq Epi: 18 /hpf / Bacteria: Moderate        Urine Cx:   Blood Cx:    Radiology:    < from: CT Abdomen and Pelvis No Cont (22 @ 08:26) >    IMPRESSION:    Mild right hydronephrosis with hemorrhage in the renal collecting system   and proximal ureter. Associated hyperdense focus within the interpolar   region of the right kidney may represent a ruptured hemorrhagic cyst into   the collecting system. An underlying mass lesion is not excluded. CT   urogram is recommended for further evaluation.  ]  Small bilateral nonobstructing renal calculi and additional calculi   likely associated with renal cysts.    < end of copied text >        CT Urogram:   < from: CT Abdomen and Pelvis Urogram w/wo IV Cont (22 @ 13:55) >  IMPRESSION:  Extensive clot in the right renal collecting system, without evidence of   enhancing renal mass or definitive urothelial lesion. Follow-up   examination once the patient's intraluminal clot has resolved is   recommended to more definitively exclude urothelial lesion.        --- End of Report ---    < end of copied text >

## 2022-12-09 NOTE — PATIENT PROFILE ADULT - CONTRAINDICATIONS & PRECAUTIONS (SELECT ALL THAT APPLY)
Patient:   KASSY BRYANT            MRN: [id number]            FIN: [id number]              Age:   45 years     Sex:  FEMALE     :  72   Associated Diagnoses:   None   Author:   TEN BELTRAN     Chief Complaint   EP consultation for recurrent ICD shocks.     History of Present Illness     45-year-old female with history of nonischemic dilated cardiomyopathy.  She was implanted a single-chamber ICD for primary prevention in 2016.  Presents with first episode of ICD shocks.  Total of 6 device therapies were delivered.  Preceded by multiple ATP's.  Patient had been off her beta-blockers for the last few days.  No preceding symptoms of chest pain, palpitation, dizziness or syncope.  She has a [employer] device.  Device interrogation was undertaken.  Episodes appear to be atrial tachycardia with inappropriate device therapies.    Breathing is fair.  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  Ejection fractions had been in the 15% range.  Routine activities to make her dyspneic.  Activity limited.  Off-and-on palpitations.  No syncope.  No chest pain.  Intermittent leg edema.        Pertinent past medical history:  1.  Peripartum cardiomyopathy  2.  Hypertension  3.  ICD 2016  4.  COPD  5.  Hypothyroidism  6.  Dyslipidemia  7.  History of CVA  8.  Morbid obesity          Review of Systems   Constitutional:  No fever.    Eye:  No visual disturbances.    Ear/Nose/Mouth/Throat:  No decreased hearing.    Cardiovascular:  Palpitations, No chest pain, No syncope.    Respiratory:  Shortness of breath.    Gastrointestinal:  No nausea, No vomiting.    Genitourinary:  No dysuria.    Musculoskeletal:  Joint pain.    Integumentary:  No rash.    Hematology/Lymphatics:  No bleeding tendency.    Neurologic:  Numbness, No headache.    Endocrine:  Negative.    Allergy/Immunologic:  Negative.    Psychiatric:  Anxiety.    All other systems All other systems are negative.     Histories    Family History: FATHER: Congestive heart failure  GRANDMOTHER: Diabetes mellitus type 1  SISTER: Asthma  BROTHER: Asthma      Social History       Alcohol  Details: Alcohol Abuse in Household: No.  Use: Past.  Details: Use: Current.  Frequency: 1-2 times per year.  Blackouts: Denies.  Change in Tolerance: Denies.  Loss of Control: Denies.  Exercise  Details: Exercise: Occasionally.  Duration (mins): 30.  Type: Walking.; Comment(s): bicycle + walking periodically  Details: Exercise: Occasionally.  Times Per Week: 1-2 times/week.  Type: Walking.; Comment(s): [facility]  Home/Environment  Details: Alcohol Abuse in Household: No.  Substance Abuse in Household: No.  Smoker in Household: No.  Patient Lives With: Daughter.  Living Situation: Lives With Family.  Ambulation: Independent.  Bathing: Independent.  Dressing: Independent.  Driving: Not Performed.  Eating: Independent.  Elimination: Independent.  Grooming: Independent.  Preparing Meal: Required Assistance.  Taking Meds: Independent.  Toileting: Independent.  Transfers:  Independent.  Current Home Treatments: CPAP, Nebulizer Treatments, Oxygen Therapy.  Assistive Devices Home: Glasses.  Sexual  Details: Sexual orientation: Straight or heterosexual.  Gender Identity: Identifies as female.  Gender on Ins: Female.  Preferred Pronouns: Female.  Substance Abuse  Details: Substance Abuse in Household: No.  Use: None.  Details: Use: None.  Tobacco  Details: Smoker in Houshold: Yes.  Smoked/Smokeless Tobacco Last 30 Days: No.  Smoking Tobacco Use: Former smoker.  Smokeless Tobacco Use Never.  Type: Cigarettes.  Cigarette Packs/Day: 1 Pack Per Day.  Details: Smoked/Smokeless Tobacco Last 30 Days: Yes.  Smoking Tobacco Use: Current every day smoker.  Smokeless Tobacco Use Never.  Type: Cigarettes.  Details: Smoked/Smokeless Tobacco Last 30 Days: Yes.  Use: Current some day smoker.  Type: Cigarettes.  Cigarette Packs/Day: 5 or More Cigarettes <1 Pack Per Day.  Details:  Used in Last 12 Months: Yes.  Use: Current every day smoker.  Type: Cigarettes.  Cultural/Buddhist Practices  Details: Buddhist or Cultural Practices While in Hospital: No.  Details: Buddhist or Cultural Practices While in Hospital: No.  .       Health Status   Allergies: Allergies (ST)   Allergies (1) Active Reaction  shellfish None Documented    Current medications:    Medications (24) Active  Scheduled: (14)  Aspirin 325 mg tab  325 mg 1 tab, Oral, Daily  Atorvastatin 40 mg tab  40 mg 1 tab, Oral, Daily  Carvedilol 25 mg tab  50 mg 2 tab, Oral, Q12H  Clopidogrel 75 mg tab  75 mg 1 tab, Oral, Daily  Escitalopram 10 mg tab  10 mg 1 tab, Oral, Daily [with dinner]  Fluticasone-vilanterol 100-25 mcg inhalation powder 14s  1 puff, MDI/DPI, Daily  Furosemide 40 mg tab  40 mg 1 tab, Oral, Daily  Heparin 5,000 unit/1 mL inj  5,000 unit 1 mL, Subcutaneous, Q8H  HydrALAZINE 50 mg tab  50 mg 1 tab, Oral, Q8H  Levothyroxine 125 mcg tab  250 mcg 2 tab, Oral, Daily  NF  Medication  1 puff, Inhaled, BID  Potassium CHLORIDE 20 mEq ER tab  20 mEq 1 tab, Oral, BID  Sacubitril-valsartan 49-51 mg tab  1 tab, Oral, BID  Spironolactone 25 mg tab  25 mg 1 tab, Oral, Daily  Continuous: (0)  PRN: (10)  Acetaminophen 325 mg tab  650 mg 2 tab, Oral, Q6H  Albuterol-ipratropium 2.5-0.5 mg/3 mL nebulizer soln  3 mL, Nebulizer, Q4H  Hydrocodone-acetaminophen 5-325 mg tab  1 tab, Oral, Q4H  Milk of magnesia 8% 30 mL oral susp UD  30 mL, Oral, QID  Morphine PF 2 mg/1 mL inj SDV  2 mg 1 mL, IV Push, Q4H  NitroGLYCERIN 0.4 mg SL tab 25s  0.4 mg 1 tab, SL, Q5 Minutes  Ondansetron 4 mg/2 mL inj SDV  4 mg 2 mL, Slow IV Push, Q8H  Senna-docusate sodium 8.6-50 mg tab  1 tab, Oral, Q Bedtime  TraZODone 100 mg tab  300 mg 3 tab, Oral, Q Bedtime  Zolpidem 5 mg tab  5 mg 1 tab, Oral, Q Bedtime      Physical Examination   VS/Measurements       Vitals between:   21-OCT-2019 13:50:37   TO   22-OCT-2019 13:50:37                   LAST RESULT MINIMUM  MAXIMUM  Temperature 36.5 36 36.9  Heart Rate 77 65 111  Respiratory Rate 16 16 18  NISBP           108 108 145  NIDBP           73 64 86  NIMBP           103 86 103  SpO2                    99 94 99  FiO2                    0.32 0.32 0.32    General:  Alert and oriented, No acute distress, Morbid obesity.   Eye:  Normal conjunctiva.    HENT:  Normocephalic, Normal hearing, Oral mucosa is moist.    Neck:  Supple, No carotid bruit, No thyromegaly.    Respiratory:  Lungs are clear to auscultation, Air entry reduced at bases.   Cardiovascular:  Normal rate, Regular rhythm, No murmur, Device site left pectoral region.  No inflammation.   Gastrointestinal:  Soft, Normal bowel sounds, No organomegaly.    Genitourinary:  No costovertebral angle tenderness.    Lymphatics:  No cervical lymphadenopathy.    Musculoskeletal:  Normal range of motion.    Integumentary:  Warm, Dry.    Neurologic:  Alert, Normal motor function.    Cognition and Speech:  Oriented, Speech clear and coherent.    Psychiatric:  Cooperative.      Review / Management   Laboratory results:       Labs between:  21-OCT-2019 13:50 to 22-OCT-2019 13:50    CBC:                 WBC  HgB  Hct  Plt  MCV  RDW   21-OCT-2019 5.6  (L) 10.2  37.9  288  (L) 72.5  (H) 21.2     DIFF:                 Seg  Neutroph//ABS  Lymph//ABS  Mono//ABS  EOS/ABS  21-OCT-2019 NOT APPLICABLE  72 // 4.1 19 // 1.1 7 // 0.4 1 // 0.1    BMP:                 Na  Cl  BUN  Glu   21-OCT-2019 138  104  7  (H) 150                              K  CO2  Cr  Ca                              4.1  28  0.84  8.7     Other Chem:             Mg  Phos  Triglycerides  GGTP  DirectBili                           1.7                          ,    .    Radiology results     Result title:  XR CHEST 2V  Result status:  Final  Verified by:  TRISTIN, CHRISTIAN on 10/21/2019 2:05  IMPRESSION: Markedly limited exam due to patient's body habitus.  Moderate cardiomegaly.  Pulmonary vascular congestion cannot be excluded.        EKG reviewed.  Sinus rhythm.  60 bpm.  Nonspecific ST changes.  Low voltage.  Normal VA interval.    Echocardiogram.  2018.    1. Procedure narrative: Transthoracic echocardiography was performed.    Image quality was suboptimal. Intravenous contrast (Definity) was    administered.  2. Left ventricle: The cavity size is severely dilated. Wall thickness is    mildly increased. There is concentric hypertrophy. Systolic function is    severely reduced. The estimated ejection fraction is 10-15%, by visual    assessment. Doppler parameters are consistent with abnormal left    ventricular relaxation (grade 1 diastolic dysfunction).  3. Left atrium: The atrium is moderately dilated.  4. Right ventricle: Systolic pressure is mildly increased. The estimated    peak pressure is 43mm Hg.  5. Pericardium, extracardiac: A small pericardial effusion is identified.       Impression and Plan     1.  Multiple ICD shocks.  Inappropriate device therapies for atrial tachycardia.  No syncope.  Probably triggered by abrupt recent discontinuation of beta-blockers.  Recommendations:  –Compliance with medications discussed.  Beta-blockers resumed.  –Device therapies were reprogrammed to avoid inappropriate therapies.  VT detection set at 200 bpm VF at 240  –Device diagnostics will be monitored including remote monitoring.  Instructed to follow-up at the office.  If frequent episodes of atrial arrhythmia persists would consider for possible catheter ablation.    2.  Nonischemic dilated cardiomyopathy.  Severe left ventricle systolic dysfunction.  Postpartum.  Functional class III.  Last EF 10 to 15%.  Recommendations:  –Continued guideline directed heart failure therapy.  Beta-blockers resume.  Also on Entresto and spironolactone.  –Further follow-up as an outpatient with the heart failure clinic.  Will also benefit with a advanced heart failure team consultation  –Aggressive risk factor modification.  Besides weight reduction and  dietary modifications total smoking cessation stressed    3.  Abnormal troponins.  Probably triggered by multiple episodes of atrial fibrillation therapy.  No EKG evidence of acute coronary syndrome.  On aspirin and statin therapy.  Further recommendations per     Thank you for the opportunity to participate in the care.   none...

## 2022-12-09 NOTE — ED PROVIDER NOTE - PHYSICAL EXAMINATION
General: appears uncomfortable, AOx3  Skin: no rash, no pallor  Head: normocephalic, atraumatic  Eyes: clear conjunctiva, EOMI  ENMT: airway patent, no nasal discharge  Cardiovascular: normal rate, normal rhythm, S1/S2, 2+ DP pulses b/l   Pulmonary: clear to auscultation bilaterally, no rales, rhonchi, or wheeze  Abdomen: soft, nontender, negative davies's sign, R cva tenderness, no L cva tenderness   Musculoskeletal: moving extremities well, no deformity  Psych: normal mood, normal affect

## 2022-12-10 LAB
A1C WITH ESTIMATED AVERAGE GLUCOSE RESULT: 6.4 % — HIGH (ref 4–5.6)
ANION GAP SERPL CALC-SCNC: 10 MMOL/L — SIGNIFICANT CHANGE UP (ref 5–17)
BUN SERPL-MCNC: 21 MG/DL — SIGNIFICANT CHANGE UP (ref 7–23)
CALCIUM SERPL-MCNC: 8.3 MG/DL — LOW (ref 8.4–10.5)
CHLORIDE SERPL-SCNC: 103 MMOL/L — SIGNIFICANT CHANGE UP (ref 96–108)
CO2 SERPL-SCNC: 25 MMOL/L — SIGNIFICANT CHANGE UP (ref 22–31)
CREAT SERPL-MCNC: 1.32 MG/DL — HIGH (ref 0.5–1.3)
EGFR: 43 ML/MIN/1.73M2 — LOW
ESTIMATED AVERAGE GLUCOSE: 137 MG/DL — HIGH (ref 68–114)
GLUCOSE BLDC GLUCOMTR-MCNC: 120 MG/DL — HIGH (ref 70–99)
GLUCOSE BLDC GLUCOMTR-MCNC: 124 MG/DL — HIGH (ref 70–99)
GLUCOSE BLDC GLUCOMTR-MCNC: 126 MG/DL — HIGH (ref 70–99)
GLUCOSE BLDC GLUCOMTR-MCNC: 129 MG/DL — HIGH (ref 70–99)
GLUCOSE SERPL-MCNC: 118 MG/DL — HIGH (ref 70–99)
HCT VFR BLD CALC: 32.4 % — LOW (ref 34.5–45)
HCV AB S/CO SERPL IA: 0.05 S/CO — SIGNIFICANT CHANGE UP (ref 0–0.99)
HCV AB SERPL-IMP: SIGNIFICANT CHANGE UP
HGB BLD-MCNC: 10.3 G/DL — LOW (ref 11.5–15.5)
MCHC RBC-ENTMCNC: 24.8 PG — LOW (ref 27–34)
MCHC RBC-ENTMCNC: 31.8 GM/DL — LOW (ref 32–36)
MCV RBC AUTO: 78.1 FL — LOW (ref 80–100)
NRBC # BLD: 0 /100 WBCS — SIGNIFICANT CHANGE UP (ref 0–0)
PLATELET # BLD AUTO: 118 K/UL — LOW (ref 150–400)
POTASSIUM SERPL-MCNC: 4.1 MMOL/L — SIGNIFICANT CHANGE UP (ref 3.5–5.3)
POTASSIUM SERPL-SCNC: 4.1 MMOL/L — SIGNIFICANT CHANGE UP (ref 3.5–5.3)
RBC # BLD: 4.15 M/UL — SIGNIFICANT CHANGE UP (ref 3.8–5.2)
RBC # FLD: 13.4 % — SIGNIFICANT CHANGE UP (ref 10.3–14.5)
SODIUM SERPL-SCNC: 138 MMOL/L — SIGNIFICANT CHANGE UP (ref 135–145)
T4 FREE SERPL-MCNC: 1.3 NG/DL — SIGNIFICANT CHANGE UP (ref 0.9–1.8)
TSH SERPL-MCNC: 1.26 UIU/ML — SIGNIFICANT CHANGE UP (ref 0.27–4.2)
WBC # BLD: 7.17 K/UL — SIGNIFICANT CHANGE UP (ref 3.8–10.5)
WBC # FLD AUTO: 7.17 K/UL — SIGNIFICANT CHANGE UP (ref 3.8–10.5)

## 2022-12-10 RX ORDER — HYDROMORPHONE HYDROCHLORIDE 2 MG/ML
0.5 INJECTION INTRAMUSCULAR; INTRAVENOUS; SUBCUTANEOUS ONCE
Refills: 0 | Status: DISCONTINUED | OUTPATIENT
Start: 2022-12-10 | End: 2022-12-10

## 2022-12-10 RX ORDER — TAMSULOSIN HYDROCHLORIDE 0.4 MG/1
0.4 CAPSULE ORAL AT BEDTIME
Refills: 0 | Status: DISCONTINUED | OUTPATIENT
Start: 2022-12-10 | End: 2022-12-13

## 2022-12-10 RX ORDER — ACETAMINOPHEN 500 MG
1000 TABLET ORAL ONCE
Refills: 0 | Status: COMPLETED | OUTPATIENT
Start: 2022-12-10 | End: 2022-12-10

## 2022-12-10 RX ORDER — MORPHINE SULFATE 50 MG/1
0.5 CAPSULE, EXTENDED RELEASE ORAL ONCE
Refills: 0 | Status: DISCONTINUED | OUTPATIENT
Start: 2022-12-10 | End: 2022-12-10

## 2022-12-10 RX ADMIN — HYDROMORPHONE HYDROCHLORIDE 0.5 MILLIGRAM(S): 2 INJECTION INTRAMUSCULAR; INTRAVENOUS; SUBCUTANEOUS at 16:20

## 2022-12-10 RX ADMIN — MORPHINE SULFATE 0.5 MILLIGRAM(S): 50 CAPSULE, EXTENDED RELEASE ORAL at 02:46

## 2022-12-10 RX ADMIN — Medication 75 MICROGRAM(S): at 05:18

## 2022-12-10 RX ADMIN — Medication 1000 MILLIGRAM(S): at 13:43

## 2022-12-10 RX ADMIN — MORPHINE SULFATE 0.5 MILLIGRAM(S): 50 CAPSULE, EXTENDED RELEASE ORAL at 01:07

## 2022-12-10 RX ADMIN — Medication 1000 MILLIGRAM(S): at 20:05

## 2022-12-10 RX ADMIN — MORPHINE SULFATE 0.5 MILLIGRAM(S): 50 CAPSULE, EXTENDED RELEASE ORAL at 03:30

## 2022-12-10 RX ADMIN — SIMVASTATIN 20 MILLIGRAM(S): 20 TABLET, FILM COATED ORAL at 21:01

## 2022-12-10 RX ADMIN — TAMSULOSIN HYDROCHLORIDE 0.4 MILLIGRAM(S): 0.4 CAPSULE ORAL at 19:38

## 2022-12-10 RX ADMIN — MORPHINE SULFATE 0.5 MILLIGRAM(S): 50 CAPSULE, EXTENDED RELEASE ORAL at 12:15

## 2022-12-10 RX ADMIN — Medication 400 MILLIGRAM(S): at 19:38

## 2022-12-10 RX ADMIN — Medication 1000 MILLIGRAM(S): at 05:00

## 2022-12-10 RX ADMIN — FAMOTIDINE 20 MILLIGRAM(S): 10 INJECTION INTRAVENOUS at 05:18

## 2022-12-10 RX ADMIN — FAMOTIDINE 20 MILLIGRAM(S): 10 INJECTION INTRAVENOUS at 17:17

## 2022-12-10 RX ADMIN — MORPHINE SULFATE 0.5 MILLIGRAM(S): 50 CAPSULE, EXTENDED RELEASE ORAL at 01:45

## 2022-12-10 RX ADMIN — SODIUM CHLORIDE 100 MILLILITER(S): 9 INJECTION INTRAMUSCULAR; INTRAVENOUS; SUBCUTANEOUS at 11:11

## 2022-12-10 RX ADMIN — MORPHINE SULFATE 0.5 MILLIGRAM(S): 50 CAPSULE, EXTENDED RELEASE ORAL at 13:35

## 2022-12-10 RX ADMIN — Medication 400 MILLIGRAM(S): at 04:10

## 2022-12-10 RX ADMIN — HYDROMORPHONE HYDROCHLORIDE 0.5 MILLIGRAM(S): 2 INJECTION INTRAMUSCULAR; INTRAVENOUS; SUBCUTANEOUS at 16:40

## 2022-12-10 RX ADMIN — Medication 400 MILLIGRAM(S): at 12:41

## 2022-12-10 NOTE — PROGRESS NOTE ADULT - NSPROGADDITIONALINFOA_GEN_ALL_CORE
discussed with patient in detail, expresses understanding of treatment plans.  discussed with patient's son at the bedside in detail

## 2022-12-10 NOTE — PROVIDER CONTACT NOTE (MEDICATION) - SITUATION
Right flank pain
Right flank pain - minimally relieved by previous dose of Ofirmev. 10/10.
Right flank pain minimally responsive to previous dose of Morphine

## 2022-12-10 NOTE — PHYSICAL THERAPY INITIAL EVALUATION ADULT - PERTINENT HX OF CURRENT PROBLEM, REHAB EVAL
w/ hx of DM, HTN, HLD, and nephrolithiasis p/w gross hematuria. CTU imaging showed extensive clot in the right renal collecting system, without evidence of enhancing renal mass or definitive urothelial lesion. Will establish outpatient follow up for further imaging and complete gross hematuria work up with cystoscopy, possible dx URS if a lesion is suspected on repeat imaging with resolution of clots, presented with 1 day of right flank pain radiating to the R groin diagnosed with right ureteric likely clot admitted for pain management and treatment w/ hx of DM, HTN, HLD, and nephrolithiasis p/w gross hematuria. CT imaging showed extensive clot in the right renal collecting system, without evidence of enhancing renal mass or definitive urothelial lesion. Will establish outpatient follow up for further imaging and complete gross hematuria work up with cystoscopy, possible dx URS if a lesion is suspected on repeat imaging with resolution of clots, presented with 1 day of right flank pain radiating to the R groin diagnosed with right ureteric likely clot admitted for pain management and treatment. Tests: 12/9 CT Abdomen/Pelvis: Extensive clot in the right renal collecting system, without evidence of enhancing renal mass or definitive urothelial lesion. Follow-up examination once the patient's intraluminal clot has resolved is recommended to more definitively exclude urothelial lesion. Mild right hydronephrosis with hemorrhage in the renal collecting system   and proximal ureter. Associated hyperdense focus within the interpolar region of the right kidney may represent a ruptured hemorrhagic cyst into the collecting system. An underlying mass lesion is not excluded. CT urogram is recommended for further evaluation. Small bilateral nonobstructing renal calculi and additional calculi likely associated with renal cysts. w/ hx of DM, HTN, HLD, and nephrolithiasis p/w gross hematuria. CT imaging showed extensive clot in the right renal collecting system, without evidence of enhancing renal mass or definitive urothelial lesion. Will establish outpatient follow up for further imaging and complete gross hematuria work up with cystoscopy, possible dx URS if a lesion is suspected on repeat imaging with resolution of clots, presented with 1 day of right flank pain radiating to the R groin diagnosed with right ureteric likely clot admitted for pain management and treatment. Tests: 12/9 CT Abdomen/Pelvis: Extensive clot in the right renal collecting system, without evidence of enhancing renal mass or definitive urothelial lesion. Follow-up examination once the patient's intraluminal clot has resolved is recommended to more definitively exclude urothelial lesion. Mild right hydronephrosis with hemorrhage in the renal collecting system   and proximal ureter. Associated hyperdense focus within the interpolar region of the right kidney may represent a ruptured hemorrhagic cyst into the collecting system. An underlying mass lesion is not excluded. CT urogram is recommended for further evaluation. Small bilateral nonobstructing renal calculi and additional calculi likely associated with renal cysts. CXR: clear lungs

## 2022-12-10 NOTE — PROGRESS NOTE ADULT - PROBLEM SELECTOR PLAN 1
likely secondary to right kidney stone causing ureteric bleeding and clot  urology input noted  discussed with urology service today they will d/w attending and get back to me  continue IVF  Flomax  pain control  may give Toradol if creatinine stable today

## 2022-12-10 NOTE — CHART NOTE - NSCHARTNOTEFT_GEN_A_CORE
MEDICINE PA EPISODIC NOTE    Notified by RN of pt. having persistent R flank pain. Pt. seen and examined w/ son present at bedside. Pt. was communicated w/ using son as  for pt. comfort. Pt. states that the morphine and IV Tylenol she received alleviated her pain. On exam, pt. has TTP of R flank area. No ecchymosis or hematoma noted. Will give 1 g IV Tylenol and reassess pain. Will endorse to day team about starting standing pain medication regimen.     Liz Almeida PA-C  Dept. of Medicine  [facility] [phone]

## 2022-12-10 NOTE — PROGRESS NOTE ADULT - PROBLEM SELECTOR PLAN 4
continue finger sticks with short acting insulin sliding scale   HbA1C  6.2  no intervention at this time

## 2022-12-10 NOTE — PHYSICAL THERAPY INITIAL EVALUATION ADULT - PLANNED THERAPY INTERVENTIONS, PT EVAL
bed mobility training/gait training/transfer training Stair Negotiation Training: Patient will be able to negotiate up & down 1 flight of stairs independently with bilateral rails, step to gait pattern, in 4 weeks./bed mobility training/gait training/transfer training

## 2022-12-11 DIAGNOSIS — N18.30 CHRONIC KIDNEY DISEASE, STAGE 3 UNSPECIFIED: ICD-10-CM

## 2022-12-11 LAB
ANION GAP SERPL CALC-SCNC: 12 MMOL/L — SIGNIFICANT CHANGE UP (ref 5–17)
BUN SERPL-MCNC: 21 MG/DL — SIGNIFICANT CHANGE UP (ref 7–23)
CALCIUM SERPL-MCNC: 8.4 MG/DL — SIGNIFICANT CHANGE UP (ref 8.4–10.5)
CHLORIDE SERPL-SCNC: 102 MMOL/L — SIGNIFICANT CHANGE UP (ref 96–108)
CO2 SERPL-SCNC: 22 MMOL/L — SIGNIFICANT CHANGE UP (ref 22–31)
CREAT SERPL-MCNC: 1.43 MG/DL — HIGH (ref 0.5–1.3)
CULTURE RESULTS: SIGNIFICANT CHANGE UP
EGFR: 39 ML/MIN/1.73M2 — LOW
GLUCOSE BLDC GLUCOMTR-MCNC: 110 MG/DL — HIGH (ref 70–99)
GLUCOSE BLDC GLUCOMTR-MCNC: 121 MG/DL — HIGH (ref 70–99)
GLUCOSE BLDC GLUCOMTR-MCNC: 123 MG/DL — HIGH (ref 70–99)
GLUCOSE BLDC GLUCOMTR-MCNC: 131 MG/DL — HIGH (ref 70–99)
GLUCOSE SERPL-MCNC: 158 MG/DL — HIGH (ref 70–99)
HCT VFR BLD CALC: 34.5 % — SIGNIFICANT CHANGE UP (ref 34.5–45)
HGB BLD-MCNC: 10.5 G/DL — LOW (ref 11.5–15.5)
LIDOCAIN IGE QN: 37 U/L — SIGNIFICANT CHANGE UP (ref 7–60)
MCHC RBC-ENTMCNC: 24.3 PG — LOW (ref 27–34)
MCHC RBC-ENTMCNC: 30.4 GM/DL — LOW (ref 32–36)
MCV RBC AUTO: 79.9 FL — LOW (ref 80–100)
NRBC # BLD: 0 /100 WBCS — SIGNIFICANT CHANGE UP (ref 0–0)
PLATELET # BLD AUTO: 112 K/UL — LOW (ref 150–400)
POTASSIUM SERPL-MCNC: 4 MMOL/L — SIGNIFICANT CHANGE UP (ref 3.5–5.3)
POTASSIUM SERPL-SCNC: 4 MMOL/L — SIGNIFICANT CHANGE UP (ref 3.5–5.3)
RBC # BLD: 4.32 M/UL — SIGNIFICANT CHANGE UP (ref 3.8–5.2)
RBC # FLD: 13.3 % — SIGNIFICANT CHANGE UP (ref 10.3–14.5)
SODIUM SERPL-SCNC: 136 MMOL/L — SIGNIFICANT CHANGE UP (ref 135–145)
SPECIMEN SOURCE: SIGNIFICANT CHANGE UP
WBC # BLD: 7.26 K/UL — SIGNIFICANT CHANGE UP (ref 3.8–10.5)
WBC # FLD AUTO: 7.26 K/UL — SIGNIFICANT CHANGE UP (ref 3.8–10.5)

## 2022-12-11 RX ORDER — HYDROMORPHONE HYDROCHLORIDE 2 MG/ML
0.5 INJECTION INTRAMUSCULAR; INTRAVENOUS; SUBCUTANEOUS ONCE
Refills: 0 | Status: DISCONTINUED | OUTPATIENT
Start: 2022-12-11 | End: 2022-12-11

## 2022-12-11 RX ORDER — HYDROMORPHONE HYDROCHLORIDE 2 MG/ML
0.5 INJECTION INTRAMUSCULAR; INTRAVENOUS; SUBCUTANEOUS EVERY 6 HOURS
Refills: 0 | Status: DISCONTINUED | OUTPATIENT
Start: 2022-12-11 | End: 2022-12-13

## 2022-12-11 RX ORDER — NIFEDIPINE 30 MG
30 TABLET, EXTENDED RELEASE 24 HR ORAL EVERY 12 HOURS
Refills: 0 | Status: DISCONTINUED | OUTPATIENT
Start: 2022-12-11 | End: 2022-12-13

## 2022-12-11 RX ORDER — HYDROMORPHONE HYDROCHLORIDE 2 MG/ML
0.5 INJECTION INTRAMUSCULAR; INTRAVENOUS; SUBCUTANEOUS EVERY 8 HOURS
Refills: 0 | Status: DISCONTINUED | OUTPATIENT
Start: 2022-12-11 | End: 2022-12-11

## 2022-12-11 RX ORDER — OXYCODONE AND ACETAMINOPHEN 5; 325 MG/1; MG/1
1 TABLET ORAL EVERY 6 HOURS
Refills: 0 | Status: DISCONTINUED | OUTPATIENT
Start: 2022-12-11 | End: 2022-12-13

## 2022-12-11 RX ADMIN — OXYCODONE AND ACETAMINOPHEN 1 TABLET(S): 5; 325 TABLET ORAL at 23:16

## 2022-12-11 RX ADMIN — FAMOTIDINE 20 MILLIGRAM(S): 10 INJECTION INTRAVENOUS at 17:04

## 2022-12-11 RX ADMIN — HYDROMORPHONE HYDROCHLORIDE 0.5 MILLIGRAM(S): 2 INJECTION INTRAMUSCULAR; INTRAVENOUS; SUBCUTANEOUS at 08:09

## 2022-12-11 RX ADMIN — Medication 30 MILLIGRAM(S): at 22:17

## 2022-12-11 RX ADMIN — SIMVASTATIN 20 MILLIGRAM(S): 20 TABLET, FILM COATED ORAL at 22:16

## 2022-12-11 RX ADMIN — FAMOTIDINE 20 MILLIGRAM(S): 10 INJECTION INTRAVENOUS at 05:28

## 2022-12-11 RX ADMIN — HYDROMORPHONE HYDROCHLORIDE 0.5 MILLIGRAM(S): 2 INJECTION INTRAMUSCULAR; INTRAVENOUS; SUBCUTANEOUS at 09:40

## 2022-12-11 RX ADMIN — OXYCODONE AND ACETAMINOPHEN 1 TABLET(S): 5; 325 TABLET ORAL at 13:50

## 2022-12-11 RX ADMIN — HYDROMORPHONE HYDROCHLORIDE 0.5 MILLIGRAM(S): 2 INJECTION INTRAMUSCULAR; INTRAVENOUS; SUBCUTANEOUS at 00:55

## 2022-12-11 RX ADMIN — Medication 75 MICROGRAM(S): at 05:28

## 2022-12-11 RX ADMIN — OXYCODONE AND ACETAMINOPHEN 1 TABLET(S): 5; 325 TABLET ORAL at 22:17

## 2022-12-11 RX ADMIN — SODIUM CHLORIDE 100 MILLILITER(S): 9 INJECTION INTRAMUSCULAR; INTRAVENOUS; SUBCUTANEOUS at 05:28

## 2022-12-11 RX ADMIN — HYDROMORPHONE HYDROCHLORIDE 0.5 MILLIGRAM(S): 2 INJECTION INTRAMUSCULAR; INTRAVENOUS; SUBCUTANEOUS at 00:31

## 2022-12-11 RX ADMIN — TAMSULOSIN HYDROCHLORIDE 0.4 MILLIGRAM(S): 0.4 CAPSULE ORAL at 22:17

## 2022-12-11 RX ADMIN — OXYCODONE AND ACETAMINOPHEN 1 TABLET(S): 5; 325 TABLET ORAL at 12:51

## 2022-12-11 RX ADMIN — SODIUM CHLORIDE 100 MILLILITER(S): 9 INJECTION INTRAMUSCULAR; INTRAVENOUS; SUBCUTANEOUS at 14:32

## 2022-12-11 NOTE — PROGRESS NOTE ADULT - PROBLEM SELECTOR PLAN 2
no evidence stone on urogram  urology follow up to r/o urothelial malignancy no evidence stone on urogram  ruled out after admission   urology follow up to r/o urothelial malignancy

## 2022-12-11 NOTE — PROGRESS NOTE ADULT - PROBLEM SELECTOR PLAN 4
continue finger sticks with short acting insulin sliding scale   HbA1C  6.2  no intervention at this time acceptable  continue to monitor  continue to hold ACE

## 2022-12-11 NOTE — PROGRESS NOTE ADULT - PROBLEM SELECTOR PLAN 5
continue synthroid  TSH normal continue finger sticks with short acting insulin sliding scale   HbA1C  6.2  no intervention at this time

## 2022-12-11 NOTE — PROGRESS NOTE ADULT - PROBLEM SELECTOR PLAN 3
acceptable  continue to monitor  continue to hold ACE baseline now with ISHMAEL on CKD  likely obstructive in etiology  will continue to monitor   bladder scan negative urinary retention

## 2022-12-11 NOTE — PROGRESS NOTE ADULT - NSPROGADDITIONALINFOA_GEN_ALL_CORE
discussed with patient in detail, expresses understanding of treatment plans.  discussed with son and daughter at bedside in detail  discharge on hold till creatinine stabilizes or improved

## 2022-12-11 NOTE — CHART NOTE - NSCHARTNOTEFT_GEN_A_CORE
Patient CT urogram reviewed, will need diagnostic ureteroscopy to investigate causes of gross hematuria/ remove obstructing clots. At this time patient is stable, afebrile, pain controlled, and okay for discharge from a urology standpoint.    Return precautions: If patient develops fevers, intractable nausea/vomiting/pain can return to ED for evaluation.    [facility]  [address] [place] [id number]  [phone] Patient CT urogram reviewed, will need diagnostic ureteroscopy to investigate causes of gross hematuria/ remove obstructing clots. At this time patient is stable, afebrile, pain controlled, and okay for discharge from a urology standpoint, Labs have been largely stable. Given recent slight increase in Cr from 1.1 to 1.3, primary team watching patient for 1 more day, will reach out to attending for possibility of inpatient ureteroscopy.    Return precautions: If patient develops fevers, intractable nausea/vomiting/pain can return to ED for evaluation.    [facility]  [address] [place] [id number]  [phone]

## 2022-12-11 NOTE — PROGRESS NOTE ADULT - PROBLEM SELECTOR PLAN 1
improved overall though still mildly symptomatic  continue IVF for now  discussed with  service   likely ureteroscopy as outpatient but resident will reach out to attending   meanwhile creatinine slightly worse   continue to monitor   scan negative for urinary retention  likely secondary to unilateral right renal obstructive disease   US kidneys in AM  Dilaudid IV for severe pain  percocet for mod pain  no NSAIDS  urine culture negative improved overall though still mildly symptomatic  continue IVF for now  discussed with  service   likely ureteroscopy as outpatient but resident will reach out to attending   meanwhile creatinine slightly worse   continue to monitor   scan negative for urinary retention  likely secondary to unilateral right renal obstructive disease   US kidneys in AM  Dilaudid IV for severe pain  percocet for mod pain  no NSAIDS  urine culture negative  lipase normal no intervention at this time  pancreatitis ruled out after admission

## 2022-12-12 ENCOUNTER — TRANSCRIPTION ENCOUNTER (OUTPATIENT)
Age: 72
End: 2022-12-12

## 2022-12-12 LAB
ANION GAP SERPL CALC-SCNC: 13 MMOL/L — SIGNIFICANT CHANGE UP (ref 5–17)
BUN SERPL-MCNC: 19 MG/DL — SIGNIFICANT CHANGE UP (ref 7–23)
CALCIUM SERPL-MCNC: 7.9 MG/DL — LOW (ref 8.4–10.5)
CHLORIDE SERPL-SCNC: 103 MMOL/L — SIGNIFICANT CHANGE UP (ref 96–108)
CO2 SERPL-SCNC: 19 MMOL/L — LOW (ref 22–31)
CREAT SERPL-MCNC: 1.21 MG/DL — SIGNIFICANT CHANGE UP (ref 0.5–1.3)
EGFR: 47 ML/MIN/1.73M2 — LOW
GLUCOSE BLDC GLUCOMTR-MCNC: 118 MG/DL — HIGH (ref 70–99)
GLUCOSE BLDC GLUCOMTR-MCNC: 120 MG/DL — HIGH (ref 70–99)
GLUCOSE BLDC GLUCOMTR-MCNC: 136 MG/DL — HIGH (ref 70–99)
GLUCOSE BLDC GLUCOMTR-MCNC: 167 MG/DL — HIGH (ref 70–99)
GLUCOSE SERPL-MCNC: 129 MG/DL — HIGH (ref 70–99)
HCT VFR BLD CALC: 32.3 % — LOW (ref 34.5–45)
HGB BLD-MCNC: 10.2 G/DL — LOW (ref 11.5–15.5)
MCHC RBC-ENTMCNC: 24.4 PG — LOW (ref 27–34)
MCHC RBC-ENTMCNC: 31.6 GM/DL — LOW (ref 32–36)
MCV RBC AUTO: 77.3 FL — LOW (ref 80–100)
NRBC # BLD: 0 /100 WBCS — SIGNIFICANT CHANGE UP (ref 0–0)
PLATELET # BLD AUTO: 115 K/UL — LOW (ref 150–400)
POTASSIUM SERPL-MCNC: 3.8 MMOL/L — SIGNIFICANT CHANGE UP (ref 3.5–5.3)
POTASSIUM SERPL-SCNC: 3.8 MMOL/L — SIGNIFICANT CHANGE UP (ref 3.5–5.3)
RBC # BLD: 4.18 M/UL — SIGNIFICANT CHANGE UP (ref 3.8–5.2)
RBC # FLD: 13.2 % — SIGNIFICANT CHANGE UP (ref 10.3–14.5)
SODIUM SERPL-SCNC: 135 MMOL/L — SIGNIFICANT CHANGE UP (ref 135–145)
WBC # BLD: 8.26 K/UL — SIGNIFICANT CHANGE UP (ref 3.8–10.5)
WBC # FLD AUTO: 8.26 K/UL — SIGNIFICANT CHANGE UP (ref 3.8–10.5)

## 2022-12-12 PROCEDURE — 99233 SBSQ HOSP IP/OBS HIGH 50: CPT

## 2022-12-12 PROCEDURE — 76770 US EXAM ABDO BACK WALL COMP: CPT | Mod: 26

## 2022-12-12 RX ORDER — SENNA PLUS 8.6 MG/1
2 TABLET ORAL DAILY
Refills: 0 | Status: COMPLETED | OUTPATIENT
Start: 2022-12-12 | End: 2022-12-12

## 2022-12-12 RX ORDER — LACTULOSE 10 G/15ML
10 SOLUTION ORAL ONCE
Refills: 0 | Status: COMPLETED | OUTPATIENT
Start: 2022-12-12 | End: 2022-12-12

## 2022-12-12 RX ORDER — SENNA PLUS 8.6 MG/1
2 TABLET ORAL
Qty: 0 | Refills: 0 | DISCHARGE
Start: 2022-12-12

## 2022-12-12 RX ADMIN — Medication 30 MILLIGRAM(S): at 23:14

## 2022-12-12 RX ADMIN — LACTULOSE 10 GRAM(S): 10 SOLUTION ORAL at 12:10

## 2022-12-12 RX ADMIN — FAMOTIDINE 20 MILLIGRAM(S): 10 INJECTION INTRAVENOUS at 05:03

## 2022-12-12 RX ADMIN — FAMOTIDINE 20 MILLIGRAM(S): 10 INJECTION INTRAVENOUS at 17:20

## 2022-12-12 RX ADMIN — Medication 1: at 17:21

## 2022-12-12 RX ADMIN — SENNA PLUS 2 TABLET(S): 8.6 TABLET ORAL at 05:05

## 2022-12-12 RX ADMIN — OXYCODONE AND ACETAMINOPHEN 1 TABLET(S): 5; 325 TABLET ORAL at 04:37

## 2022-12-12 RX ADMIN — OXYCODONE AND ACETAMINOPHEN 1 TABLET(S): 5; 325 TABLET ORAL at 12:08

## 2022-12-12 RX ADMIN — Medication 30 MILLIGRAM(S): at 12:09

## 2022-12-12 RX ADMIN — OXYCODONE AND ACETAMINOPHEN 1 TABLET(S): 5; 325 TABLET ORAL at 12:50

## 2022-12-12 RX ADMIN — OXYCODONE AND ACETAMINOPHEN 1 TABLET(S): 5; 325 TABLET ORAL at 21:05

## 2022-12-12 RX ADMIN — SODIUM CHLORIDE 100 MILLILITER(S): 9 INJECTION INTRAMUSCULAR; INTRAVENOUS; SUBCUTANEOUS at 05:04

## 2022-12-12 RX ADMIN — OXYCODONE AND ACETAMINOPHEN 1 TABLET(S): 5; 325 TABLET ORAL at 04:03

## 2022-12-12 RX ADMIN — TAMSULOSIN HYDROCHLORIDE 0.4 MILLIGRAM(S): 0.4 CAPSULE ORAL at 22:22

## 2022-12-12 RX ADMIN — SIMVASTATIN 20 MILLIGRAM(S): 20 TABLET, FILM COATED ORAL at 22:22

## 2022-12-12 RX ADMIN — Medication 75 MICROGRAM(S): at 05:03

## 2022-12-12 RX ADMIN — SENNA PLUS 2 TABLET(S): 8.6 TABLET ORAL at 23:15

## 2022-12-12 RX ADMIN — OXYCODONE AND ACETAMINOPHEN 1 TABLET(S): 5; 325 TABLET ORAL at 20:34

## 2022-12-12 NOTE — PROGRESS NOTE ADULT - PROBLEM SELECTOR PLAN 1
much improved overall  continue IVF for now as patient just had an episode of hematuria with hematuria  creatinine improved to 1.2  continue to monitor   US kidneys pending   Dilaudid IV for severe pain  percocet for mod pain

## 2022-12-12 NOTE — DISCHARGE NOTE PROVIDER - NSDCMRMEDTOKEN_GEN_ALL_CORE_FT
Aspirin Enteric Coated 81 mg oral delayed release tablet: 1 tab(s) orally once a day  Calcium 600+D 600 mg-5 mcg (200 intl units) oral tablet: 1 tab(s) orally 2 times a day  famotidine 20 mg oral tablet: 1 tab(s) orally 2 times a day  lisinopril-hydrochlorothiazide 20 mg-25 mg oral tablet: 1 tab(s) orally once a day  metFORMIN 500 mg oral tablet, extended release: 1 tab(s) orally once a day  simvastatin 20 mg oral tablet: 1 tab(s) orally once a day (at bedtime)  Synthroid 75 mcg (0.075 mg) oral tablet: 1 tab(s) orally once a day   Aspirin Enteric Coated 81 mg oral delayed release tablet: 1 tab(s) orally once a day  Calcium 600+D 600 mg-5 mcg (200 intl units) oral tablet: 1 tab(s) orally 2 times a day  famotidine 20 mg oral tablet: 1 tab(s) orally 2 times a day  lisinopril-hydrochlorothiazide 20 mg-25 mg oral tablet: 1 tab(s) orally once a day  metFORMIN 500 mg oral tablet, extended release: 1 tab(s) orally once a day  Rolling Walker: 1   once a day   senna leaf extract oral tablet: 2 tab(s) orally once a day, As needed, Constipation  simvastatin 20 mg oral tablet: 1 tab(s) orally once a day (at bedtime)  Synthroid 75 mcg (0.075 mg) oral tablet: 1 tab(s) orally once a day   Aspirin Enteric Coated 81 mg oral delayed release tablet: 1 tab(s) orally once a day  Calcium 600+D 600 mg-5 mcg (200 intl units) oral tablet: 1 tab(s) orally 2 times a day  famotidine 20 mg oral tablet: 1 tab(s) orally 2 times a day  lisinopril-hydrochlorothiazide 20 mg-25 mg oral tablet: 1 tab(s) orally once a day  metFORMIN 500 mg oral tablet, extended release: 1 tab(s) orally once a day  Rolling Walker: 1   once a day   senna leaf extract oral tablet: 2 tab(s) orally once a day, As needed, Constipation  simvastatin 20 mg oral tablet: 1 tab(s) orally once a day (at bedtime)  Synthroid 75 mcg (0.075 mg) oral tablet: 1 tab(s) orally once a day  tamsulosin 0.4 mg oral capsule: 1 cap(s) orally once a day (at bedtime)   Calcium 600+D 600 mg-5 mcg (200 intl units) oral tablet: 1 tab(s) orally 2 times a day  famotidine 20 mg oral tablet: 1 tab(s) orally 2 times a day  lisinopril-hydrochlorothiazide 20 mg-25 mg oral tablet: 1 tab(s) orally once a day  metFORMIN 500 mg oral tablet, extended release: 1 tab(s) orally once a day  oxycodone-acetaminophen 5 mg-325 mg oral tablet: 1 tab(s) orally every 6 hours, As needed, Moderate Pain (4 - 6) MDD:4 tabs  Rolling Walker: 1   once a day   senna leaf extract oral tablet: 2 tab(s) orally once a day, As needed, Constipation  simvastatin 20 mg oral tablet: 1 tab(s) orally once a day (at bedtime)  Synthroid 75 mcg (0.075 mg) oral tablet: 1 tab(s) orally once a day  tamsulosin 0.4 mg oral capsule: 1 cap(s) orally once a day (at bedtime).

## 2022-12-12 NOTE — DISCHARGE NOTE PROVIDER - CARE PROVIDER_API CALL
Tristan Mena)  Urology  [address] [place] [address]  Phone: [phone]  Fax: [phone]  Follow Up Time:

## 2022-12-12 NOTE — PROVIDER CONTACT NOTE (OTHER) - ASSESSMENT
pt c/o right groin pain
Blood pressure consistently running high. Jessie Nicholas NP notified.
Last BM 12/8  NAD

## 2022-12-12 NOTE — DISCHARGE NOTE PROVIDER - HOSPITAL COURSE
72y Female who presented with 1 day of right flank pain radiating to the R groin. CT A/P revealed b/l renal cysts and stones, mild hydronephrosis, +hemorrhagic componenet. CT Urogram confirmed extensive clot in right renal collecting system without evidence of enhancing renal mass or urothelial lesion. U/A, Ucx were negative for infection. Pt was started on opiates for pain management and IVF's. Labs were checked daily, ACE/diuretic medication was held 72y Female who presented with 1 day of right flank pain radiating to the R groin. CT A/P revealed b/l renal cysts and stones, mild hydronephrosis, +hemorrhagic componenet. CT Urogram confirmed extensive clot in right renal collecting system without evidence of enhancing renal mass or urothelial lesion. U/A, Ucx were negative for infection. Pt was started on opiates for pain management and IVF's. Labs were checked daily, ACE/diuretic medication was held. Renal US revealed mild right hydronephrosis, increased renal echogenicity. 72y Female who presented with 1 day of right flank pain radiating to the R groin. CT A/P revealed b/l renal cysts and stones, mild hydronephrosis, +hemorrhagic componenet. CT Urogram confirmed extensive clot in right renal collecting system without evidence of enhancing renal mass or urothelial lesion. U/A, Ucx were negative for infection. Pt was started on opiates for pain management and IVF's. Labs were checked daily, ACE/diuretic medication was held. Renal US revealed mild right hydronephrosis, increased renal echogenicity. Pt was evaluated by physical therapy, deemed needing outpatient PT. Pt declined stating she had strong family support at home. Rolling Pt 72y Female who presented with 1 day of right flank pain radiating to the R groin. CT A/P revealed b/l renal cysts and stones, mild hydronephrosis, +hemorrhagic componenet. CT Urogram confirmed extensive clot in right renal collecting system without evidence of enhancing renal mass or urothelial lesion. U/A, Ucx were negative for infection. ASA was held for bleeding. Pt was started on opiates for pain management and IVF's. Labs were checked daily, ACE/diuretic medication was held. Renal US revealed mild right hydronephrosis, increased renal echogenicity. Pt was evaluated by physical therapy, deemed needing outpatient PT. Pt declined stating she had strong family support at home. Rolling Pt

## 2022-12-12 NOTE — DISCHARGE NOTE PROVIDER - NSDCCPCAREPLAN_GEN_ALL_CORE_FT
PRINCIPAL DISCHARGE DIAGNOSIS  Diagnosis: Renal hematuria  Assessment and Plan of Treatment: If you experience fevers, intractable nausea/vomiting or intractable pain, return to emergency room for evaluation

## 2022-12-12 NOTE — DISCHARGE NOTE PROVIDER - NSDCFUADDINST_GEN_ALL_CORE_FT
Call office of Urologist Dr Mena to schedule a follow up appointment in the next 10-14 days  Call your Medical doctor in the next 7-10 days for follow up   Call office of Urologist Dr Mena to schedule a follow up appointment in the next 10-14 days. Call your Medical doctor in the next 7-10 days for follow up    DO NOT TAKE ASPIRIN UNTIL DIRECTED BY YOUR PRIMARY MEDICAL DOCTOR OR UROLOGIST

## 2022-12-12 NOTE — PROGRESS NOTE ADULT - PROBLEM SELECTOR PLAN 3
improved overnight   likely obstructive in etiology  will continue to monitor   bladder scan negative urinary retention

## 2022-12-12 NOTE — PROGRESS NOTE ADULT - PROBLEM SELECTOR PLAN 2
no evidence stone on urogram  ruled out after admission   urology follow up to r/o urothelial malignancy

## 2022-12-13 ENCOUNTER — TRANSCRIPTION ENCOUNTER (OUTPATIENT)
Age: 72
End: 2022-12-13

## 2022-12-13 VITALS
SYSTOLIC BLOOD PRESSURE: 120 MMHG | DIASTOLIC BLOOD PRESSURE: 58 MMHG | HEART RATE: 94 BPM | OXYGEN SATURATION: 94 % | TEMPERATURE: 98 F | RESPIRATION RATE: 18 BRPM

## 2022-12-13 PROBLEM — I10 ESSENTIAL (PRIMARY) HYPERTENSION: Chronic | Status: ACTIVE | Noted: 2021-10-22

## 2022-12-13 PROBLEM — E11.9 TYPE 2 DIABETES MELLITUS WITHOUT COMPLICATIONS: Chronic | Status: ACTIVE | Noted: 2021-10-22

## 2022-12-13 PROBLEM — E07.9 DISORDER OF THYROID, UNSPECIFIED: Chronic | Status: ACTIVE | Noted: 2021-10-22

## 2022-12-13 PROBLEM — E78.5 HYPERLIPIDEMIA, UNSPECIFIED: Chronic | Status: ACTIVE | Noted: 2021-10-22

## 2022-12-13 LAB
ANION GAP SERPL CALC-SCNC: 11 MMOL/L — SIGNIFICANT CHANGE UP (ref 5–17)
BUN SERPL-MCNC: 18 MG/DL — SIGNIFICANT CHANGE UP (ref 7–23)
CALCIUM SERPL-MCNC: 8.6 MG/DL — SIGNIFICANT CHANGE UP (ref 8.4–10.5)
CHLORIDE SERPL-SCNC: 105 MMOL/L — SIGNIFICANT CHANGE UP (ref 96–108)
CO2 SERPL-SCNC: 23 MMOL/L — SIGNIFICANT CHANGE UP (ref 22–31)
CREAT SERPL-MCNC: 1.06 MG/DL — SIGNIFICANT CHANGE UP (ref 0.5–1.3)
EGFR: 55 ML/MIN/1.73M2 — LOW
GLUCOSE BLDC GLUCOMTR-MCNC: 114 MG/DL — HIGH (ref 70–99)
GLUCOSE SERPL-MCNC: 131 MG/DL — HIGH (ref 70–99)
HCT VFR BLD CALC: 33 % — LOW (ref 34.5–45)
HGB BLD-MCNC: 10.3 G/DL — LOW (ref 11.5–15.5)
MCHC RBC-ENTMCNC: 24.2 PG — LOW (ref 27–34)
MCHC RBC-ENTMCNC: 31.2 GM/DL — LOW (ref 32–36)
MCV RBC AUTO: 77.5 FL — LOW (ref 80–100)
NRBC # BLD: 0 /100 WBCS — SIGNIFICANT CHANGE UP (ref 0–0)
PLATELET # BLD AUTO: 132 K/UL — LOW (ref 150–400)
POTASSIUM SERPL-MCNC: 4.1 MMOL/L — SIGNIFICANT CHANGE UP (ref 3.5–5.3)
POTASSIUM SERPL-SCNC: 4.1 MMOL/L — SIGNIFICANT CHANGE UP (ref 3.5–5.3)
RBC # BLD: 4.26 M/UL — SIGNIFICANT CHANGE UP (ref 3.8–5.2)
RBC # FLD: 13.2 % — SIGNIFICANT CHANGE UP (ref 10.3–14.5)
SODIUM SERPL-SCNC: 139 MMOL/L — SIGNIFICANT CHANGE UP (ref 135–145)
WBC # BLD: 5.14 K/UL — SIGNIFICANT CHANGE UP (ref 3.8–10.5)
WBC # FLD AUTO: 5.14 K/UL — SIGNIFICANT CHANGE UP (ref 3.8–10.5)

## 2022-12-13 PROCEDURE — 96361 HYDRATE IV INFUSION ADD-ON: CPT

## 2022-12-13 PROCEDURE — 90662 IIV NO PRSV INCREASED AG IM: CPT

## 2022-12-13 PROCEDURE — 81001 URINALYSIS AUTO W/SCOPE: CPT

## 2022-12-13 PROCEDURE — 84439 ASSAY OF FREE THYROXINE: CPT

## 2022-12-13 PROCEDURE — 82803 BLOOD GASES ANY COMBINATION: CPT

## 2022-12-13 PROCEDURE — 96375 TX/PRO/DX INJ NEW DRUG ADDON: CPT

## 2022-12-13 PROCEDURE — 74178 CT ABD&PLV WO CNTR FLWD CNTR: CPT | Mod: MA

## 2022-12-13 PROCEDURE — 97116 GAIT TRAINING THERAPY: CPT

## 2022-12-13 PROCEDURE — 82962 GLUCOSE BLOOD TEST: CPT

## 2022-12-13 PROCEDURE — 80048 BASIC METABOLIC PNL TOTAL CA: CPT

## 2022-12-13 PROCEDURE — 82330 ASSAY OF CALCIUM: CPT

## 2022-12-13 PROCEDURE — 83036 HEMOGLOBIN GLYCOSYLATED A1C: CPT

## 2022-12-13 PROCEDURE — 87086 URINE CULTURE/COLONY COUNT: CPT

## 2022-12-13 PROCEDURE — 86803 HEPATITIS C AB TEST: CPT

## 2022-12-13 PROCEDURE — 85018 HEMOGLOBIN: CPT

## 2022-12-13 PROCEDURE — 97530 THERAPEUTIC ACTIVITIES: CPT

## 2022-12-13 PROCEDURE — 85027 COMPLETE CBC AUTOMATED: CPT

## 2022-12-13 PROCEDURE — 97161 PT EVAL LOW COMPLEX 20 MIN: CPT

## 2022-12-13 PROCEDURE — 83690 ASSAY OF LIPASE: CPT

## 2022-12-13 PROCEDURE — 84295 ASSAY OF SERUM SODIUM: CPT

## 2022-12-13 PROCEDURE — 99285 EMERGENCY DEPT VISIT HI MDM: CPT

## 2022-12-13 PROCEDURE — 74176 CT ABD & PELVIS W/O CONTRAST: CPT | Mod: MA

## 2022-12-13 PROCEDURE — 82947 ASSAY GLUCOSE BLOOD QUANT: CPT

## 2022-12-13 PROCEDURE — 96376 TX/PRO/DX INJ SAME DRUG ADON: CPT

## 2022-12-13 PROCEDURE — 84443 ASSAY THYROID STIM HORMONE: CPT

## 2022-12-13 PROCEDURE — 76770 US EXAM ABDO BACK WALL COMP: CPT

## 2022-12-13 PROCEDURE — 87637 SARSCOV2&INF A&B&RSV AMP PRB: CPT

## 2022-12-13 PROCEDURE — 83605 ASSAY OF LACTIC ACID: CPT

## 2022-12-13 PROCEDURE — 84132 ASSAY OF SERUM POTASSIUM: CPT

## 2022-12-13 PROCEDURE — 85014 HEMATOCRIT: CPT

## 2022-12-13 PROCEDURE — 85025 COMPLETE CBC W/AUTO DIFF WBC: CPT

## 2022-12-13 PROCEDURE — 82435 ASSAY OF BLOOD CHLORIDE: CPT

## 2022-12-13 PROCEDURE — 96374 THER/PROPH/DIAG INJ IV PUSH: CPT

## 2022-12-13 PROCEDURE — 71046 X-RAY EXAM CHEST 2 VIEWS: CPT

## 2022-12-13 PROCEDURE — 80053 COMPREHEN METABOLIC PANEL: CPT

## 2022-12-13 PROCEDURE — 36415 COLL VENOUS BLD VENIPUNCTURE: CPT

## 2022-12-13 RX ORDER — SENNA PLUS 8.6 MG/1
2 TABLET ORAL
Qty: 14 | Refills: 0
Start: 2022-12-13 | End: 2022-12-19

## 2022-12-13 RX ORDER — LACTULOSE 10 G/15ML
15 SOLUTION ORAL ONCE
Refills: 0 | Status: COMPLETED | OUTPATIENT
Start: 2022-12-13 | End: 2022-12-13

## 2022-12-13 RX ORDER — POLYETHYLENE GLYCOL 3350 17 G/17G
17 POWDER, FOR SOLUTION ORAL
Qty: 119 | Refills: 0
Start: 2022-12-13 | End: 2022-12-19

## 2022-12-13 RX ORDER — TAMSULOSIN HYDROCHLORIDE 0.4 MG/1
1 CAPSULE ORAL
Qty: 30 | Refills: 0
Start: 2022-12-13 | End: 2023-01-11

## 2022-12-13 RX ORDER — OXYCODONE AND ACETAMINOPHEN 5; 325 MG/1; MG/1
1 TABLET ORAL
Qty: 28 | Refills: 0
Start: 2022-12-13 | End: 2022-12-19

## 2022-12-13 RX ADMIN — Medication 30 MILLIGRAM(S): at 11:03

## 2022-12-13 RX ADMIN — OXYCODONE AND ACETAMINOPHEN 1 TABLET(S): 5; 325 TABLET ORAL at 13:10

## 2022-12-13 RX ADMIN — FAMOTIDINE 20 MILLIGRAM(S): 10 INJECTION INTRAVENOUS at 06:41

## 2022-12-13 RX ADMIN — OXYCODONE AND ACETAMINOPHEN 1 TABLET(S): 5; 325 TABLET ORAL at 11:04

## 2022-12-13 RX ADMIN — LACTULOSE 15 GRAM(S): 10 SOLUTION ORAL at 11:04

## 2022-12-13 RX ADMIN — LACTULOSE 10 GRAM(S): 10 SOLUTION ORAL at 00:03

## 2022-12-13 RX ADMIN — INFLUENZA VIRUS VACCINE 0.7 MILLILITER(S): 15; 15; 15; 15 SUSPENSION INTRAMUSCULAR at 12:58

## 2022-12-13 RX ADMIN — Medication 75 MICROGRAM(S): at 06:41

## 2022-12-13 NOTE — PROGRESS NOTE ADULT - PROBLEM SELECTOR PLAN 6
continue synthroid  TSH normal home dose
continue synthroid  TSH normal
continue synthroid  TSH normal

## 2022-12-13 NOTE — PROGRESS NOTE ADULT - PROBLEM SELECTOR PLAN 2
no evidence stone on urogram  ruled out after admission   urology follow up to r/o urothelial malignancy  son already has an appt with Dr Mena urology for outpatient  work up r/o malignancy

## 2022-12-13 NOTE — PROGRESS NOTE ADULT - ASSESSMENT
72y Female who presented with 1 day of right flank pain radiating to the R groin diagnosed with right ureteric likely clot admitted for pain management and treatment 
Right flank pain and hematuria, unknown source, H/H stable, patient comfortable  Pain controlled overnight   Urine culture negative    - encourage fluids  - pain control  - cleared for discharge home from  perspective  - patient to follow up in the urology office in 2-3 weeks for cystoscopy and planning for possible diagnostic ureteroscopy as outpatient   - D/w Dr. Culp    [facility]  [address] [place] [address], [place] [id number]  [place] [id number] [phone]  
72y Female who presented with 1 day of right flank pain radiating to the R groin diagnosed with right ureteric likely clot admitted for pain management and treatment 

## 2022-12-13 NOTE — DISCHARGE NOTE NURSING/CASE MANAGEMENT/SOCIAL WORK - PATIENT PORTAL LINK FT
You can access the FollowMyHealth Patient Portal offered by [facility] by registering at the following website: [email or web address]. By joining [facility]’s FollowMyHealth portal, you will also be able to view your health information using other applications (apps) compatible with our system.

## 2022-12-13 NOTE — PROGRESS NOTE ADULT - SUBJECTIVE AND OBJECTIVE BOX
Patient is a 72y old  Female who presents with a chief complaint of right flank pain (12 Dec 2022 15:22)      DATE OF SERVICE: 12-13-22 @ 13:39    SUBJECTIVE / OVERNIGHT EVENTS: overnight events noted  no hematuria overnight     "I feel much better"     ROS:  Resp: No cough no sputum production  CVS: No chest pain no palpitations no orthopnea  GI: no N/V/D small BM overnight   : no dysuria, no hematuria  Neuro: no weakness no paresthesias  Heme: No petechiae no easy bruising  Msk: No joint pain no swelling  Skin: No rash no itching        MEDICATIONS  (STANDING):  dextrose 5%. 1000 milliLiter(s) (50 mL/Hr) IV Continuous <Continuous>  dextrose 5%. 1000 milliLiter(s) (100 mL/Hr) IV Continuous <Continuous>  dextrose 50% Injectable 25 Gram(s) IV Push once  dextrose 50% Injectable 12.5 Gram(s) IV Push once  dextrose 50% Injectable 25 Gram(s) IV Push once  famotidine    Tablet 20 milliGRAM(s) Oral two times a day  glucagon  Injectable 1 milliGRAM(s) IntraMuscular once  insulin lispro (ADMELOG) corrective regimen sliding scale   SubCutaneous three times a day before meals  levothyroxine 75 MICROGram(s) Oral daily  NIFEdipine XL 30 milliGRAM(s) Oral every 12 hours  simvastatin 20 milliGRAM(s) Oral at bedtime  tamsulosin 0.4 milliGRAM(s) Oral at bedtime    MEDICATIONS  (PRN):  dextrose Oral Gel 15 Gram(s) Oral once PRN Blood Glucose LESS THAN 70 milliGRAM(s)/deciliter  HYDROmorphone  Injectable 0.5 milliGRAM(s) IV Push every 6 hours PRN Severe Pain (7 - 10)  oxycodone    5 mG/acetaminophen 325 mG 1 Tablet(s) Oral every 6 hours PRN Moderate Pain (4 - 6)        CAPILLARY BLOOD GLUCOSE      POCT Blood Glucose.: 114 mg/dL (13 Dec 2022 08:26)  POCT Blood Glucose.: 136 mg/dL (12 Dec 2022 22:07)  POCT Blood Glucose.: 167 mg/dL (12 Dec 2022 17:17)    I&O's Summary    12 Dec 2022 07:01  -  13 Dec 2022 07:00  --------------------------------------------------------  IN: 300 mL / OUT: 1100 mL / NET: -800 mL        Vital Signs Last 24 Hrs  T(C): 36.7 (13 Dec 2022 05:10), Max: 37.4 (12 Dec 2022 17:51)  T(F): 98.1 (13 Dec 2022 05:10), Max: 99.3 (12 Dec 2022 17:51)  HR: 94 (13 Dec 2022 05:10) (81 - 94)  BP: 120/58 (13 Dec 2022 05:10) (120/58 - 139/71)  BP(mean): --  RR: 18 (13 Dec 2022 05:10) (18 - 18)  SpO2: 94% (13 Dec 2022 05:10) (92% - 94%)    PHYSICAL EXAM:   HEENT: SOCORRO EOMI  Neck: Supple, no JVD  Lungs: clear  CVS: S1 S2 no M/R/G  Abdomen: no tenderness  Neuro: AO x 3 non focal  Ext: no edema  Msk: no joint swelling   Back: no right CVA tenderness today    LABS:                        10.3   5.14  )-----------( 132      ( 13 Dec 2022 06:41 )             33.0     12-13    139  |  105  |  18  ----------------------------<  131<H>  4.1   |  23  |  1.06    Ca    8.6      13 Dec 2022 06:41                  All consultant(s) notes reviewed and care discussed with other providers        Contact Number, Dr Alford [phone]
Subjective    Patient seen and examined with son at bedside. States she feels well, reports pain is much better since admission. Denies nausea/vomiting but has minimal appetite. Denies fevers/chills. Passing some clots with voids but able to empty well.     Objective    Vital signs  T(F): , Max: 99.6 (12-11-22 @ 13:14)  HR: 81 (12-12-22 @ 00:00)  BP: 145/77 (12-12-22 @ 00:00)  SpO2: 93% (12-12-22 @ 00:00)  Wt(kg): --    Output     12-11 @ 07:01  -  12-12 @ 07:00  --------------------------------------------------------  IN: 896 mL / OUT: 0 mL / NET: 896 mL        General: NAD  Abdomen: soft/non-tender/non-distended  : no CVA tenderness b/l     Labs      12-12 @ 06:49    WBC 8.26  / Hct 32.3  / SCr 1.21     12-11 @ 09:02    WBC 7.26  / Hct 34.5  / SCr 1.43       
Patient is a 72y old  Female who presents with a chief complaint of right flank pain (10 Dec 2022 11:08)  patient declined use of  services, preferred family at bedside translate instead       DATE OF SERVICE: 22 @ 14:41    SUBJECTIVE / OVERNIGHT EVENTS: overnight events noted  son and daughter at bedside  patient states feels generally weak  right flank pain improved though still present occasionally     ROS:  Resp: No cough no sputum production  CVS: No chest pain no palpitations no orthopnea  GI: no N/V/D  : no dysuria, no hematuria  Neuro: see above   Heme: No petechiae no easy bruising  Msk: No joint pain no swelling  Skin: No rash no itching        MEDICATIONS  (STANDING):  dextrose 5%. 1000 milliLiter(s) (50 mL/Hr) IV Continuous <Continuous>  dextrose 5%. 1000 milliLiter(s) (100 mL/Hr) IV Continuous <Continuous>  dextrose 50% Injectable 25 Gram(s) IV Push once  dextrose 50% Injectable 12.5 Gram(s) IV Push once  dextrose 50% Injectable 25 Gram(s) IV Push once  famotidine    Tablet 20 milliGRAM(s) Oral two times a day  glucagon  Injectable 1 milliGRAM(s) IntraMuscular once  influenza  Vaccine (HIGH DOSE) 0.7 milliLiter(s) IntraMuscular once  insulin lispro (ADMELOG) corrective regimen sliding scale   SubCutaneous three times a day before meals  levothyroxine 75 MICROGram(s) Oral daily  simvastatin 20 milliGRAM(s) Oral at bedtime  sodium chloride 0.9%. 1000 milliLiter(s) (100 mL/Hr) IV Continuous <Continuous>  tamsulosin 0.4 milliGRAM(s) Oral at bedtime    MEDICATIONS  (PRN):  dextrose Oral Gel 15 Gram(s) Oral once PRN Blood Glucose LESS THAN 70 milliGRAM(s)/deciliter  HYDROmorphone  Injectable 0.5 milliGRAM(s) IV Push every 6 hours PRN Severe Pain (7 - 10)  oxycodone    5 mG/acetaminophen 325 mG 1 Tablet(s) Oral every 6 hours PRN Moderate Pain (4 - 6)        CAPILLARY BLOOD GLUCOSE      POCT Blood Glucose.: 131 mg/dL (11 Dec 2022 12:37)  POCT Blood Glucose.: 123 mg/dL (11 Dec 2022 09:12)  POCT Blood Glucose.: 129 mg/dL (10 Dec 2022 21:35)  POCT Blood Glucose.: 124 mg/dL (10 Dec 2022 17:02)    I&O's Summary    10 Dec 2022 07:01  -  11 Dec 2022 07:00  --------------------------------------------------------  IN: 1340 mL / OUT: 0 mL / NET: 1340 mL    11 Dec 2022 07:01  -  11 Dec 2022 14:41  --------------------------------------------------------  IN: 220 mL / OUT: 0 mL / NET: 220 mL        Vital Signs Last 24 Hrs  T(C): 37.6 (11 Dec 2022 13:14), Max: 37.8 (11 Dec 2022 05:50)  T(F): 99.6 (11 Dec 2022 13:14), Max: 100.1 (11 Dec 2022 05:50)  HR: 88 (11 Dec 2022 13:14) (78 - 89)  BP: 164/80 (11 Dec 2022 13:14) (134/69 - 179/80)  BP(mean): --  RR: 18 (11 Dec 2022 13:14) (18 - 18)  SpO2: 98% (11 Dec 2022 13:14) (91% - 98%)    PHYSICAL EXAM: family at bedside   HEENT: SOCORRO EOMI  Neck: Supple, no JVD  Lungs: clear  CVS: S1 S2 no M/R/G  Abdomen: no tenderness  Neuro: AO x 3 non focal  Ext: no edema  Msk: no joint swelling   Back: right CVA tenderness resolved     LABS:                        10.5   7.26  )-----------( 112      ( 11 Dec 2022 09:02 )             34.5     12    136  |  102  |  21  ----------------------------<  158<H>  4.0   |  22  |  1.43<H>    Ca    8.4      11 Dec 2022 09:02            Urinalysis Basic - ( 09 Dec 2022 20:10 )    Color: Light Yellow / Appearance: Clear / S.048 / pH: x  Gluc: x / Ketone: Negative  / Bili: Negative / Urobili: Negative   Blood: x / Protein: Negative / Nitrite: Negative   Leuk Esterase: Negative / RBC: 110 /hpf / WBC 4 /HPF   Sq Epi: x / Non Sq Epi: 1 /hpf / Bacteria: Negative          All consultant(s) notes reviewed and care discussed with other providers        Contact Number, Dr Alford [phone]
Patient is a 72y old  Female who presents with a chief complaint of right flank pain (12 Dec 2022 10:42)  patient declined use of  services, preferred son at bedside translate instead     DATE OF SERVICE: 12-12-22 @ 15:22    SUBJECTIVE / OVERNIGHT EVENTS: overnight events noted  "I feel much better" feels generalized weakness     ROS:  Resp: No cough no sputum production  CVS: No chest pain no palpitations no orthopnea  GI: no N/V/D  : no dysuria, ++ hematuria with clots        MEDICATIONS  (STANDING):  dextrose 5%. 1000 milliLiter(s) (50 mL/Hr) IV Continuous <Continuous>  dextrose 5%. 1000 milliLiter(s) (100 mL/Hr) IV Continuous <Continuous>  dextrose 50% Injectable 25 Gram(s) IV Push once  dextrose 50% Injectable 12.5 Gram(s) IV Push once  dextrose 50% Injectable 25 Gram(s) IV Push once  famotidine    Tablet 20 milliGRAM(s) Oral two times a day  glucagon  Injectable 1 milliGRAM(s) IntraMuscular once  influenza  Vaccine (HIGH DOSE) 0.7 milliLiter(s) IntraMuscular once  insulin lispro (ADMELOG) corrective regimen sliding scale   SubCutaneous three times a day before meals  levothyroxine 75 MICROGram(s) Oral daily  NIFEdipine XL 30 milliGRAM(s) Oral every 12 hours  simvastatin 20 milliGRAM(s) Oral at bedtime  tamsulosin 0.4 milliGRAM(s) Oral at bedtime    MEDICATIONS  (PRN):  dextrose Oral Gel 15 Gram(s) Oral once PRN Blood Glucose LESS THAN 70 milliGRAM(s)/deciliter  HYDROmorphone  Injectable 0.5 milliGRAM(s) IV Push every 6 hours PRN Severe Pain (7 - 10)  oxycodone    5 mG/acetaminophen 325 mG 1 Tablet(s) Oral every 6 hours PRN Moderate Pain (4 - 6)  senna 2 Tablet(s) Oral daily PRN Constipation        CAPILLARY BLOOD GLUCOSE      POCT Blood Glucose.: 118 mg/dL (12 Dec 2022 11:55)  POCT Blood Glucose.: 120 mg/dL (12 Dec 2022 08:13)  POCT Blood Glucose.: 121 mg/dL (11 Dec 2022 22:16)  POCT Blood Glucose.: 110 mg/dL (11 Dec 2022 17:07)    I&O's Summary    11 Dec 2022 07:01  -  12 Dec 2022 07:00  --------------------------------------------------------  IN: 896 mL / OUT: 0 mL / NET: 896 mL        Vital Signs Last 24 Hrs  T(C): 37.5 (12 Dec 2022 13:00), Max: 37.5 (12 Dec 2022 13:00)  T(F): 99.5 (12 Dec 2022 13:00), Max: 99.5 (12 Dec 2022 13:00)  HR: 84 (12 Dec 2022 13:00) (81 - 84)  BP: 115/65 (12 Dec 2022 13:00) (115/65 - 182/82)  BP(mean): --  RR: 18 (12 Dec 2022 13:00) (18 - 18)  SpO2: 96% (12 Dec 2022 13:00) (93% - 96%)    PHYSICAL EXAM:   HEENT: SOCORRO EOMI  Neck: Supple, no JVD  Lungs: clear  CVS: S1 S2 no M/R/G  Abdomen: no tenderness  Neuro: AO x 3 non focal  Ext: no edema  Msk: no joint swelling   Back: right CVA tenderness resolved     LABS:                        10.2   8.26  )-----------( 115      ( 12 Dec 2022 06:49 )             32.3     12-12    135  |  103  |  19  ----------------------------<  129<H>  3.8   |  19<L>  |  1.21    Ca    7.9<L>      12 Dec 2022 06:49                  All consultant(s) notes reviewed and care discussed with other providers        Contact Number, Dr Alford [phone]
Patient is a 72y old  Female who presents with a chief complaint of right flank pain (09 Dec 2022 21:09)  patient declined use of  services, preferred son at bedside translate instead     DATE OF SERVICE: 12-10-22 @ 11:08    SUBJECTIVE / OVERNIGHT EVENTS: overnight events noted    ROS:  Resp: No cough no sputum production  CVS: No chest pain no palpitations no orthopnea  GI: no N/V/D  : no dysuria, no hematuria  ++ right flank pain overnight   Neuro: no weakness no paresthesias          MEDICATIONS  (STANDING):  dextrose 5%. 1000 milliLiter(s) (50 mL/Hr) IV Continuous <Continuous>  dextrose 5%. 1000 milliLiter(s) (100 mL/Hr) IV Continuous <Continuous>  dextrose 50% Injectable 25 Gram(s) IV Push once  dextrose 50% Injectable 12.5 Gram(s) IV Push once  dextrose 50% Injectable 25 Gram(s) IV Push once  famotidine    Tablet 20 milliGRAM(s) Oral two times a day  glucagon  Injectable 1 milliGRAM(s) IntraMuscular once  influenza  Vaccine (HIGH DOSE) 0.7 milliLiter(s) IntraMuscular once  insulin lispro (ADMELOG) corrective regimen sliding scale   SubCutaneous three times a day before meals  levothyroxine 75 MICROGram(s) Oral daily  simvastatin 20 milliGRAM(s) Oral at bedtime  sodium chloride 0.9%. 1000 milliLiter(s) (100 mL/Hr) IV Continuous <Continuous>    MEDICATIONS  (PRN):  dextrose Oral Gel 15 Gram(s) Oral once PRN Blood Glucose LESS THAN 70 milliGRAM(s)/deciliter        CAPILLARY BLOOD GLUCOSE      POCT Blood Glucose.: 126 mg/dL (10 Dec 2022 08:24)  POCT Blood Glucose.: 109 mg/dL (09 Dec 2022 21:41)  POCT Blood Glucose.: 134 mg/dL (09 Dec 2022 18:38)    I&O's Summary      Vital Signs Last 24 Hrs  T(C): 36.8 (10 Dec 2022 04:38), Max: 37.5 (09 Dec 2022 21:30)  T(F): 98.2 (10 Dec 2022 04:38), Max: 99.5 (09 Dec 2022 21:30)  HR: 84 (10 Dec 2022 04:38) (78 - 84)  BP: 131/49 (10 Dec 2022 04:38) (131/49 - 167/73)  BP(mean): 95 (09 Dec 2022 11:44) (95 - 95)  RR: 20 (10 Dec 2022 04:38) (16 - 20)  SpO2: 97% (10 Dec 2022 04:38) (97% - 100%)    PHYSICAL EXAM:   HEENT: SOCORRO EOMI  Neck: Supple, no JVD  Lungs: clear  CVS: S1 S2 no M/R/G  Abdomen: no tenderness  Neuro: AO x 3 non focal  Skin: warm, dry  Ext: no edema  Msk: no joint swelling   Back: right CVA tenderness +    LABS:                        11.2   5.33  )-----------( 142      ( 09 Dec 2022 16:54 )             36.2         142  |  103  |  19  ----------------------------<  209<H>  4.2   |  25  |  1.15    Ca    9.4      09 Dec 2022 07:41    TPro  7.0  /  Alb  4.6  /  TBili  0.4  /  DBili  x   /  AST  17  /  ALT  20  /  AlkPhos  58  12-          Urinalysis Basic - ( 09 Dec 2022 20:10 )    Color: Light Yellow / Appearance: Clear / S.048 / pH: x  Gluc: x / Ketone: Negative  / Bili: Negative / Urobili: Negative   Blood: x / Protein: Negative / Nitrite: Negative   Leuk Esterase: Negative / RBC: 110 /hpf / WBC 4 /HPF   Sq Epi: x / Non Sq Epi: 1 /hpf / Bacteria: Negative          All consultant(s) notes reviewed and care discussed with other providers        Contact Number, Dr Alford [phone]

## 2022-12-13 NOTE — PROGRESS NOTE ADULT - PROBLEM SELECTOR PLAN 1
resolved  creatinine further improved to 1.0 now baseline  US kidneys noted  mild right hydro  percocet for mod pain on discharge  cleared for discharge by all services

## 2022-12-13 NOTE — DISCHARGE NOTE NURSING/CASE MANAGEMENT/SOCIAL WORK - NSDCVIVACCINE_GEN_ALL_CORE_FT
No Vaccines Administered. influenza, high-dose, quadrivalent; 13-Dec-2022 12:58; Cahntell Singh (RN); [employer]; [id number] (Exp. Date: 30-Jun-2023); IntraMuscular; Deltoid Left.; 0.7 milliLiter(s); VIS (VIS Published: 06-Aug-2021, VIS Presented: 13-Dec-2022);

## 2022-12-13 NOTE — PROGRESS NOTE ADULT - NSPROGADDITIONALINFOA_GEN_ALL_CORE
discussed with patient in detail, expresses understanding of treatment plans.  discussed with patient's son at the bedside in detail discussed with patient in detail, expresses understanding of treatment plans.  discussed with patient's son at the bedside in detail  patient and family refusing Subacute Rehab

## 2022-12-15 PROBLEM — Z00.00 ENCOUNTER FOR PREVENTIVE HEALTH EXAMINATION: Status: ACTIVE | Noted: 2022-12-15

## 2022-12-16 ENCOUNTER — APPOINTMENT (OUTPATIENT)
Dept: UROLOGY | Facility: CLINIC | Age: 72
End: 2022-12-16

## 2022-12-16 RX ORDER — ASPIRIN/CALCIUM CARB/MAGNESIUM 324 MG
1 TABLET ORAL
Qty: 0 | Refills: 0 | DISCHARGE

## 2023-01-06 ENCOUNTER — OUTPATIENT (OUTPATIENT)
Dept: OUTPATIENT SERVICES | Facility: HOSPITAL | Age: 73
LOS: 1 days | End: 2023-01-06
Payer: MEDICAID

## 2023-01-06 ENCOUNTER — APPOINTMENT (OUTPATIENT)
Dept: UROLOGY | Facility: CLINIC | Age: 73
End: 2023-01-06
Payer: MEDICAID

## 2023-01-06 VITALS
SYSTOLIC BLOOD PRESSURE: 137 MMHG | HEIGHT: 62 IN | BODY MASS INDEX: 29.63 KG/M2 | DIASTOLIC BLOOD PRESSURE: 75 MMHG | RESPIRATION RATE: 18 BRPM | WEIGHT: 161 LBS | HEART RATE: 94 BPM | TEMPERATURE: 97.4 F

## 2023-01-06 DIAGNOSIS — Z00.00 ENCOUNTER FOR GENERAL ADULT MEDICAL EXAMINATION WITHOUT ABNORMAL FINDINGS: ICD-10-CM

## 2023-01-06 DIAGNOSIS — E03.9 HYPOTHYROIDISM, UNSPECIFIED: ICD-10-CM

## 2023-01-06 DIAGNOSIS — I10 ESSENTIAL (PRIMARY) HYPERTENSION: ICD-10-CM

## 2023-01-06 DIAGNOSIS — Z78.9 OTHER SPECIFIED HEALTH STATUS: ICD-10-CM

## 2023-01-06 DIAGNOSIS — R31.0 GROSS HEMATURIA: ICD-10-CM

## 2023-01-06 DIAGNOSIS — E78.5 HYPERLIPIDEMIA, UNSPECIFIED: ICD-10-CM

## 2023-01-06 DIAGNOSIS — Z83.3 FAMILY HISTORY OF DIABETES MELLITUS: ICD-10-CM

## 2023-01-06 DIAGNOSIS — Z90.710 ACQUIRED ABSENCE OF BOTH CERVIX AND UTERUS: Chronic | ICD-10-CM

## 2023-01-06 PROBLEM — N20.0 CALCULUS OF KIDNEY: Chronic | Status: ACTIVE | Noted: 2022-12-09

## 2023-01-06 PROBLEM — E11.9 TYPE 2 DIABETES MELLITUS WITHOUT COMPLICATIONS: Chronic | Status: ACTIVE | Noted: 2022-12-09

## 2023-01-06 PROCEDURE — 99204 OFFICE O/P NEW MOD 45 MIN: CPT

## 2023-01-06 PROCEDURE — G0463: CPT

## 2023-01-06 RX ORDER — LEVOTHYROXINE SODIUM 75 UG/1
75 TABLET ORAL
Refills: 0 | Status: ACTIVE | COMMUNITY

## 2023-01-06 RX ORDER — SIMVASTATIN 20 MG/1
20 TABLET, FILM COATED ORAL
Refills: 0 | Status: ACTIVE | COMMUNITY

## 2023-01-06 RX ORDER — FAMOTIDINE 20 MG/1
20 TABLET, FILM COATED ORAL
Refills: 0 | Status: ACTIVE | COMMUNITY

## 2023-01-06 RX ORDER — METFORMIN HYDROCHLORIDE 500 MG/1
500 TABLET, COATED ORAL
Refills: 0 | Status: ACTIVE | COMMUNITY

## 2023-01-06 RX ORDER — LISINOPRIL 20 MG/1
20 TABLET ORAL
Refills: 0 | Status: ACTIVE | COMMUNITY

## 2023-01-06 NOTE — HISTORY OF PRESENT ILLNESS
[FreeTextEntry1] : 72y Female with history of HTN, HLD, DM2, hypothyroidism, and remote history of nephrolithiasis which she passed on her own presenting for follow up about 3 weeks after being discharged from [facility]. She presented in early December with 1 day of right flank pain radiating to the R groin. CT A/P revealed b/l renal cysts and non-obstructing stones, mild hydronephrosis, +hemorrhagic component. CT Urogram confirmed extensive clot in right renal collecting system without evidence of enhancing renal mass or urothelial lesion. U/A, Ucx were negative for infection. ASA was held for bleeding. Pt was started on opiates for pain management and IVF's. Labs were checked daily, ACE/diuretic medication was held. Renal US revealed mild right hydronephrosis, increased renal echogenicity. Urology was consulted in house who recommended outpatient diagnostic ureteroscopy to assess the filling defects found on the CT urogram.\par \par After discharge, patient states that she had some right flank pain controlled with oral medications as well as gross hematuria for 5 days after discharge. Since then she states that her flank pain has been controlled and that her gross hematuria has resolved. She believes she may have passed as mall stone on day 5 after discharge. Patient endorses drinking plenty of water. Currently she is pain controlled with no recent fevers, chills, nausea or vomiting. Surgical history includes hysterectomy and carpal tunnel release. Denies any history of smoking cigarettes or alcohol use. Denies any personal or family history of kidney cancer or bladder cancer.

## 2023-01-06 NOTE — ASSESSMENT
[FreeTextEntry1] : 72y Female with history of HTN, HLD, DM2, hypothyroidism, and remote history of nephrolithiasis which she passed on her own presenting for follow up about 3 weeks after being discharged from [facility]. Gross hematuria and flank pain resolved 5 days after discharge. She also believed she passed a stone at the time. States she is feeling slightly weak but denies any gross hematuria or flank pain. CT urogram in the hospital showed no definitive renal malignancy but many filling defects, possible hemorrhagic cyst, and nonobstructing right kidney stones. \par - Discussed possible etiology of symptoms\par - Urinalysis and Urine Culture sent\par - CBC and BMP sent to assess Hct (due to weakness) and Cr\par - Discussed importance of diagnostic ureteroscopy to have a more definitive look at possible cause of gross hematuria with associated cystoscopy\par - Patient and son agreed to procedure\par - Plan for PST and to book for diagnostic right ureteroscopy with possible biopsy with Dr. Evans\par - Risks and benefits discussed with patient at length\par - Follow up in clinic after procedure.

## 2023-01-06 NOTE — PHYSICAL EXAM

## 2023-01-23 ENCOUNTER — OUTPATIENT (OUTPATIENT)
Dept: OUTPATIENT SERVICES | Facility: HOSPITAL | Age: 73
LOS: 1 days | End: 2023-01-23
Payer: MEDICAID

## 2023-01-23 VITALS
DIASTOLIC BLOOD PRESSURE: 74 MMHG | SYSTOLIC BLOOD PRESSURE: 115 MMHG | RESPIRATION RATE: 16 BRPM | WEIGHT: 158.07 LBS | TEMPERATURE: 98 F | HEIGHT: 61 IN | OXYGEN SATURATION: 100 % | HEART RATE: 77 BPM

## 2023-01-23 DIAGNOSIS — E11.9 TYPE 2 DIABETES MELLITUS WITHOUT COMPLICATIONS: ICD-10-CM

## 2023-01-23 DIAGNOSIS — Z90.710 ACQUIRED ABSENCE OF BOTH CERVIX AND UTERUS: Chronic | ICD-10-CM

## 2023-01-23 DIAGNOSIS — E78.5 HYPERLIPIDEMIA, UNSPECIFIED: ICD-10-CM

## 2023-01-23 DIAGNOSIS — R31.0 GROSS HEMATURIA: ICD-10-CM

## 2023-01-23 DIAGNOSIS — K21.9 GASTRO-ESOPHAGEAL REFLUX DISEASE WITHOUT ESOPHAGITIS: ICD-10-CM

## 2023-01-23 DIAGNOSIS — I10 ESSENTIAL (PRIMARY) HYPERTENSION: ICD-10-CM

## 2023-01-23 DIAGNOSIS — Z01.818 ENCOUNTER FOR OTHER PREPROCEDURAL EXAMINATION: ICD-10-CM

## 2023-01-23 DIAGNOSIS — Z98.890 OTHER SPECIFIED POSTPROCEDURAL STATES: Chronic | ICD-10-CM

## 2023-01-23 DIAGNOSIS — E03.9 HYPOTHYROIDISM, UNSPECIFIED: ICD-10-CM

## 2023-01-23 LAB
BLD GP AB SCN SERPL QL: SIGNIFICANT CHANGE UP
SARS-COV-2 RNA SPEC QL NAA+PROBE: SIGNIFICANT CHANGE UP

## 2023-01-23 PROCEDURE — G0463: CPT

## 2023-01-23 NOTE — H&P PST ADULT - GASTROINTESTINAL
soft/nontender/nondistended/normal active bowel sounds/no guarding/no rigidity/no organomegaly/no palpable shemar/no masses palpable details…

## 2023-01-23 NOTE — H&P PST ADULT - NSANTHOSAYNRD_GEN_A_CORE
No. [place] screening performed.  STOP BANG Legend: 0-2 = LOW Risk; 3-4 = INTERMEDIATE Risk; 5-8 = HIGH Risk

## 2023-01-23 NOTE — H&P PST ADULT - NSICDXPROCEDURE_GEN_ALL_CORE_FT
PROCEDURES:  Cystoscopy with ureteroscopy 23-Jan-2023 15:16:11  Destiny Bryan  Insertion, renal artery stent 23-Jan-2023 15:16:37  Destiny Bryan  Retrograde pyelogram 23-Jan-2023 15:16:55  Destiny Bryan

## 2023-01-23 NOTE — H&P PST ADULT - HISTORY OF PRESENT ILLNESS
71 y/o Turkish-speaking female accompanied by daughter, with PMH of T2DM, Hypothyroidism, HTN, HTD, Hep B treated, GERD, complains of right flank pain associated with hematuria for 9 days continually. She is diagnosed with hematuria and is scheduled for  cystoscopy, right ureteroscopy possible biopsy of renal pelvic mass, stent placement and retrograde pyelogram on 1/26/2023 71 y/o Mohawk-speaking female accompanied by daughter, with PMH of T2DM, Hypothyroidism, HTN, HLD, GERD, complains of right flank pain associated with hematuria for 9 days continually, now resolved. She is diagnosed with hematuria and is scheduled for  cystoscopy, right ureteroscopy possible, biopsy of renal pelvic mass, stent placement and retrograde pyelogram on 1/26/2023 71 y/o Vietnamese-speaking female accompanied by daughter, with PMH of T2DM, Hypothyroidism, HTN, HLD, GERD, complains of right flank pain associated with hematuria for 9 days continually, now resolved. She is diagnosed with hematuria and right renal mass and is scheduled for  cystoscopy, right ureteroscopy, possible biopsy of renal pelvic mass, stent placement and retrograde pyelogram on 1/26/2023

## 2023-01-23 NOTE — H&P PST ADULT - NSICDXFAMILYHX_GEN_ALL_CORE_FT
FAMILY HISTORY:  Father  Still living? No  FH: stomach cancer, Age at diagnosis: Age Unknown    Mother  Still living? No  FH: type 2 diabetes, Age at diagnosis: Age Unknown    Sibling  Still living? Unknown  FH: type 2 diabetes, Age at diagnosis: Age Unknown

## 2023-01-23 NOTE — H&P PST ADULT - PROBLEM SELECTOR PLAN 3
Hold metformin the morning of surgery  A1C result pending in PST  POC glucose the morning of surgery

## 2023-01-23 NOTE — H&P PST ADULT - PROBLEM SELECTOR PLAN 1
Scheduled for  cystoscopy, right ureteroscopy possible biopsy of renal pelvic mass, stent placement and retrograde pyelogram on 1/26/2023  Preoperative instructions discussed and given to patient.   Instructed to schedule COVID 19 test 3 days prior to surgery.   Discussed preprocedure skin preparation using  chlorhexidine gluconate 4% solution three days prior to  surgery.  Instructed patient to avoid aspirin and aspirin products, over the counter medications such as vitamins and herbal medications, one week prior to surgery.  Take Tylenol as needed for pain  Patient verbalized understanding of instructions

## 2023-01-23 NOTE — H&P PST ADULT - NSICDXPASTMEDICALHX_GEN_ALL_CORE_FT
PAST MEDICAL HISTORY:  Atherosclerotic heart disease     DM (diabetes mellitus)     DM (diabetes mellitus)     HLD (hyperlipidemia)     HLD (hyperlipidemia)     HTN (hypertension)     HTN (hypertension)     Hypothyroid     Kidney stone     Thyroid disease

## 2023-01-23 NOTE — H&P PST ADULT - ASSESSMENT
71 y/o [place] Islander-speaking female accompanied by daughter, with PMH of T2DM, Hypothyroidism, HTN, HLD, GERD,is diagnosed with hematuria  71 y/o Gibraltarian-speaking female accompanied by daughter, with PMH of T2DM, Hypothyroidism, HTN, HLD, GERD,is diagnosed with hematuria   STOP BANG SCORE IS 1

## 2023-01-23 NOTE — H&P PST ADULT - MUSCULOSKELETAL
details… bilateral upper extremities with decreased FROM associated with pain from OA/ROM intact/decreased ROM due to pain/normal gait/strength 5/5 bilateral lower extremities

## 2023-01-25 ENCOUNTER — TRANSCRIPTION ENCOUNTER (OUTPATIENT)
Age: 73
End: 2023-01-25

## 2023-01-26 ENCOUNTER — APPOINTMENT (OUTPATIENT)
Dept: UROLOGY | Facility: HOSPITAL | Age: 73
End: 2023-01-26

## 2023-01-26 ENCOUNTER — RESULT REVIEW (OUTPATIENT)
Age: 73
End: 2023-01-26

## 2023-01-26 ENCOUNTER — TRANSCRIPTION ENCOUNTER (OUTPATIENT)
Age: 73
End: 2023-01-26

## 2023-01-26 ENCOUNTER — OUTPATIENT (OUTPATIENT)
Dept: OUTPATIENT SERVICES | Facility: HOSPITAL | Age: 73
LOS: 1 days | End: 2023-01-26
Payer: MEDICAID

## 2023-01-26 VITALS
RESPIRATION RATE: 16 BRPM | WEIGHT: 158.07 LBS | HEIGHT: 61 IN | HEART RATE: 85 BPM | SYSTOLIC BLOOD PRESSURE: 127 MMHG | DIASTOLIC BLOOD PRESSURE: 76 MMHG | OXYGEN SATURATION: 100 % | TEMPERATURE: 99 F

## 2023-01-26 VITALS
RESPIRATION RATE: 18 BRPM | SYSTOLIC BLOOD PRESSURE: 145 MMHG | HEART RATE: 67 BPM | DIASTOLIC BLOOD PRESSURE: 74 MMHG | TEMPERATURE: 98 F | OXYGEN SATURATION: 98 %

## 2023-01-26 DIAGNOSIS — Z90.710 ACQUIRED ABSENCE OF BOTH CERVIX AND UTERUS: Chronic | ICD-10-CM

## 2023-01-26 DIAGNOSIS — Z98.890 OTHER SPECIFIED POSTPROCEDURAL STATES: Chronic | ICD-10-CM

## 2023-01-26 DIAGNOSIS — R31.0 GROSS HEMATURIA: ICD-10-CM

## 2023-01-26 PROBLEM — I25.10 ATHEROSCLEROTIC HEART DISEASE OF NATIVE CORONARY ARTERY WITHOUT ANGINA PECTORIS: Chronic | Status: ACTIVE | Noted: 2023-01-23

## 2023-01-26 LAB
BLD GP AB SCN SERPL QL: SIGNIFICANT CHANGE UP
GLUCOSE BLDC GLUCOMTR-MCNC: 121 MG/DL — HIGH (ref 70–99)
GLUCOSE BLDC GLUCOMTR-MCNC: 130 MG/DL — HIGH (ref 70–99)

## 2023-01-26 PROCEDURE — 76000 FLUOROSCOPY <1 HR PHYS/QHP: CPT

## 2023-01-26 PROCEDURE — 86900 BLOOD TYPING SEROLOGIC ABO: CPT

## 2023-01-26 PROCEDURE — 52332 CYSTOSCOPY AND TREATMENT: CPT | Mod: RT

## 2023-01-26 PROCEDURE — 36415 COLL VENOUS BLD VENIPUNCTURE: CPT

## 2023-01-26 PROCEDURE — 86850 RBC ANTIBODY SCREEN: CPT

## 2023-01-26 PROCEDURE — 88112 CYTOPATH CELL ENHANCE TECH: CPT | Mod: 26

## 2023-01-26 PROCEDURE — 88305 TISSUE EXAM BY PATHOLOGIST: CPT | Mod: 26

## 2023-01-26 PROCEDURE — 52355 CYSTOURETERO W/EXCISE TUMOR: CPT | Mod: RT

## 2023-01-26 PROCEDURE — 88112 CYTOPATH CELL ENHANCE TECH: CPT

## 2023-01-26 PROCEDURE — 86901 BLOOD TYPING SEROLOGIC RH(D): CPT

## 2023-01-26 PROCEDURE — 74420 UROGRAPHY RTRGR +-KUB: CPT | Mod: 26

## 2023-01-26 PROCEDURE — 88305 TISSUE EXAM BY PATHOLOGIST: CPT

## 2023-01-26 PROCEDURE — C1769: CPT

## 2023-01-26 PROCEDURE — C1758: CPT

## 2023-01-26 PROCEDURE — C2617: CPT

## 2023-01-26 PROCEDURE — 52354 CYSTOURETERO W/BIOPSY: CPT | Mod: RT

## 2023-01-26 PROCEDURE — C1889: CPT

## 2023-01-26 PROCEDURE — 82962 GLUCOSE BLOOD TEST: CPT

## 2023-01-26 DEVICE — URETERAL CATH OPEN END FLEXI-TIP 5FR .038" X 70CM: Type: IMPLANTABLE DEVICE | Status: FUNCTIONAL

## 2023-01-26 DEVICE — LASER FIBER FLEXIVA 550: Type: IMPLANTABLE DEVICE | Status: FUNCTIONAL

## 2023-01-26 DEVICE — GUIDEWIRE SENSOR DUAL-FLEX NITINOL STRAIGHT .035" X 150CM: Type: IMPLANTABLE DEVICE | Status: FUNCTIONAL

## 2023-01-26 DEVICE — IMPLANTABLE DEVICE: Type: IMPLANTABLE DEVICE | Status: FUNCTIONAL

## 2023-01-26 DEVICE — GUIDEWIRE AMPLATZ SUPER-STIFF .038" X 145CM 3.5CM FLEXIBLE: Type: IMPLANTABLE DEVICE | Status: FUNCTIONAL

## 2023-01-26 DEVICE — URETERAL STENT PERCUFLEX PLUS 6FR 24CM: Type: IMPLANTABLE DEVICE | Status: FUNCTIONAL

## 2023-01-26 DEVICE — GUIDEWIRE SENSOR DUAL-FLEX NITINOL STRAIGHT .038" X 150CM: Type: IMPLANTABLE DEVICE | Status: FUNCTIONAL

## 2023-01-26 DEVICE — URETERAL CATH OPEN END 5FR 70CM: Type: IMPLANTABLE DEVICE | Status: FUNCTIONAL

## 2023-01-26 DEVICE — LASER FIBER FLEXIVA 365: Type: IMPLANTABLE DEVICE | Status: FUNCTIONAL

## 2023-01-26 DEVICE — URETERAL SHEATH NAVIGATOR HD 12/14FR X 28CM: Type: IMPLANTABLE DEVICE | Status: FUNCTIONAL

## 2023-01-26 DEVICE — LASER FIBER FLEXIVA 242 HI POWER: Type: IMPLANTABLE DEVICE | Status: FUNCTIONAL

## 2023-01-26 DEVICE — STONE BASKET GEMINI HELICAL 4-WIRE 3FR 120CM X 11MM OD X 0 TIP: Type: IMPLANTABLE DEVICE | Status: FUNCTIONAL

## 2023-01-26 DEVICE — GUIDEWIRE AMPLATZ SUPER-STIFF .035" X 145CM 3.5CM FLEXIBLE: Type: IMPLANTABLE DEVICE | Status: FUNCTIONAL

## 2023-01-26 DEVICE — STONE EXTRACTOR NCIRCLE TIPLESS 3FR 1CM: Type: IMPLANTABLE DEVICE | Status: FUNCTIONAL

## 2023-01-26 DEVICE — GUIDEWIRE SENSOR DUAL-FLEX NITINOL ANGLED .038" X 150CM: Type: IMPLANTABLE DEVICE | Status: FUNCTIONAL

## 2023-01-26 RX ORDER — METFORMIN HYDROCHLORIDE 850 MG/1
1 TABLET ORAL
Qty: 0 | Refills: 0 | DISCHARGE

## 2023-01-26 RX ORDER — SODIUM CHLORIDE 9 MG/ML
1000 INJECTION, SOLUTION INTRAVENOUS
Refills: 0 | Status: DISCONTINUED | OUTPATIENT
Start: 2023-01-26 | End: 2023-01-26

## 2023-01-26 RX ORDER — FAMOTIDINE 10 MG/ML
1 INJECTION INTRAVENOUS
Qty: 0 | Refills: 0 | DISCHARGE

## 2023-01-26 RX ORDER — KETOROLAC TROMETHAMINE 30 MG/ML
15 SYRINGE (ML) INJECTION ONCE
Refills: 0 | Status: DISCONTINUED | OUTPATIENT
Start: 2023-01-26 | End: 2023-01-26

## 2023-01-26 RX ORDER — OXYCODONE HYDROCHLORIDE 5 MG/1
5 TABLET ORAL ONCE
Refills: 0 | Status: DISCONTINUED | OUTPATIENT
Start: 2023-01-26 | End: 2023-01-26

## 2023-01-26 RX ORDER — ONDANSETRON 8 MG/1
4 TABLET, FILM COATED ORAL ONCE
Refills: 0 | Status: DISCONTINUED | OUTPATIENT
Start: 2023-01-26 | End: 2023-01-26

## 2023-01-26 RX ORDER — LISINOPRIL/HYDROCHLOROTHIAZIDE 10-12.5 MG
1 TABLET ORAL
Qty: 0 | Refills: 0 | DISCHARGE

## 2023-01-26 RX ORDER — FENTANYL CITRATE 50 UG/ML
50 INJECTION INTRAVENOUS
Refills: 0 | Status: DISCONTINUED | OUTPATIENT
Start: 2023-01-26 | End: 2023-01-26

## 2023-01-26 RX ORDER — IBUPROFEN 200 MG
600 TABLET ORAL ONCE
Refills: 0 | Status: DISCONTINUED | OUTPATIENT
Start: 2023-01-26 | End: 2023-02-09

## 2023-01-26 RX ORDER — SODIUM CHLORIDE 9 MG/ML
3 INJECTION INTRAMUSCULAR; INTRAVENOUS; SUBCUTANEOUS EVERY 8 HOURS
Refills: 0 | Status: DISCONTINUED | OUTPATIENT
Start: 2023-01-26 | End: 2023-01-26

## 2023-01-26 RX ORDER — FENTANYL CITRATE 50 UG/ML
25 INJECTION INTRAVENOUS
Refills: 0 | Status: DISCONTINUED | OUTPATIENT
Start: 2023-01-26 | End: 2023-01-26

## 2023-01-26 RX ORDER — LEVOTHYROXINE SODIUM 125 MCG
1 TABLET ORAL
Qty: 0 | Refills: 0 | DISCHARGE

## 2023-01-26 RX ORDER — SIMVASTATIN 20 MG/1
1 TABLET, FILM COATED ORAL
Qty: 0 | Refills: 0 | DISCHARGE

## 2023-01-26 RX ADMIN — OXYCODONE HYDROCHLORIDE 5 MILLIGRAM(S): 5 TABLET ORAL at 12:43

## 2023-01-26 RX ADMIN — FENTANYL CITRATE 25 MICROGRAM(S): 50 INJECTION INTRAVENOUS at 10:21

## 2023-01-26 RX ADMIN — FENTANYL CITRATE 25 MICROGRAM(S): 50 INJECTION INTRAVENOUS at 10:36

## 2023-01-26 RX ADMIN — Medication 15 MILLIGRAM(S): at 12:57

## 2023-01-26 RX ADMIN — Medication 15 MILLIGRAM(S): at 13:21

## 2023-01-26 RX ADMIN — OXYCODONE HYDROCHLORIDE 5 MILLIGRAM(S): 5 TABLET ORAL at 11:39

## 2023-01-26 NOTE — ASU DISCHARGE PLAN (ADULT/PEDIATRIC) - ASU DC SPECIAL INSTRUCTIONSFT
STENT: You may have an internal stent (a hollow tube that runs from the kidney to your bladder) after your procedure, which helps urine drain from the kidney to your bladder. Some patients experience urinary frequency, burning, or even back pain (especially with urination). These sensations will gradually get better. Increasing your fluid intake can also improve these symptoms. While the stent is in place, your urine may continue to be bloody. This stent is temporary and must be removed by your urologist as an outpatient with in 3 months, on 2/4/23.   GENERAL: It is common to have blood in your urine after your procedure. It may be pink or even red; inform your doctor if you have a significant amount of clot in the urine or if you are unable to void at all. The urine may clear and then become bloody again especially as you are more physically active.  BATHING: You may shower or bathe.  DIET: You may resume your regular diet and regular medication regimen.  PAIN: You may take Tylenol (acetaminophen) 650-975mg and/or Motrin (ibuprofen) 400-600mg, both available over the counter, for pain every 6 hours as needed. Do not exceed 4000mg of Tylenol (acetaminophen) daily. You may alternate these medications such that you take one or the other every 3 hours for around the clock pain coverage.   ANTIBIOTICS: You may be given a prescription for an antibiotic, please take this medication as instructed and be sure to complete the entire course.  STOOL SOFTENERS: Do not allow yourself to become constipated as straining may cause bleeding. Take stool softeners or a laxative (ex. Miralax, Colace, Senokot, ExLax, etc), available over the counter, if needed.  ACTIVITY: No heavy lifting or strenuous exercise until you are evaluated at your post-operative appointment. Otherwise, you may return to your usual level of physical activity.  ANTICOAGULATION: If you are taking any blood thinning medications, please discuss with your urologist prior to restarting these medications unless otherwise specified.  FOLLOW-UP: If you did not already schedule your post-operative appointment, please call your urologist to schedule and follow-up appointment at clinic on 2/3/23  CALL YOUR UROLOGIST IF: You have any bleeding that does not stop, inability to void >8 hours, fever over 100.4 F, chills, persistent nausea/vomiting, changes in your incision concerning for infection, or if your pain is not controlled on your discharge pain medications.

## 2023-01-26 NOTE — ASU PATIENT PROFILE, ADULT - FALL HARM RISK - PATIENT NEEDS ASSISTANCE
Pedialyte in liquid or ice pop form is the best way to keep a child hydrated  It is OK to mix with a splash of favorite drink or Gatorade for taste (but too much sugar worsens diarrhea)   After a vomit or diarrhea episode, wait 20 minutes and offer a few ounces of pedialyte  If child has another episode, wait again and offer half that amount  You can even syringe feed 5 milliliters every 30 minutes  The trick is to do this at regular intervals, as much as they can tolerate  Please call if vomiting even little sips, child is irritable and not consolable, less than 3-5 urinations in a 24 hour period, or child difficult to wake up  Thanks for sharing about your cousin's illness  I believe that Lilia Barriga is not at risk for MRSA infection (which would by by touch to a draining wound or a surface that your cousin's eye touched potentially  Even so the risk is rare with good skin hygiene    (if family worried you can have everyone , even children, use Hibiclens type soap found in the [facility] (one bottle of heavy duty soap) No assistance needed

## 2023-01-26 NOTE — ASU DISCHARGE PLAN (ADULT/PEDIATRIC) - CARE PROVIDER_API CALL
Daniel Evans)  Urology  [address] [facility] [facility], [address] [place] [address]  [place] [address]  Phone: [phone]  Fax: [phone]  Scheduled Appointment: 02/03/2023

## 2023-01-26 NOTE — ASU PATIENT PROFILE, ADULT - FALL HARM RISK - UNIVERSAL INTERVENTIONS
Bed in lowest position, wheels locked, appropriate side rails in place/Call bell, personal items and telephone in reach/Instruct patient to call for assistance before getting out of bed or chair/Non-slip footwear when patient is out of bed/[place] to call system/Physically safe environment - no spills, clutter or unnecessary equipment/Purposeful Proactive Rounding/Room/bathroom lighting operational, light cord in reach

## 2023-01-26 NOTE — ASU DISCHARGE PLAN (ADULT/PEDIATRIC) - NS MD DC FALL RISK RISK
For information on Fall & Injury Prevention, visit: [email or web address] OR  [email or web address] OR  https://www.cdc.gov/steadi/patient.html

## 2023-01-26 NOTE — ASU DISCHARGE PLAN (ADULT/PEDIATRIC) - D. IF YOU HAD ANY TYPE OF SEDATION, YOU MAY EXPERIENCE LIGHTHEADEDNESS, DIZZINESS, OR SLEEPINESS FOLLOWING YOUR PROCEDURE. A RESPONSIBLE ADULT SHOULD STAY WITH YOU FOR AT LEAST 24 HOURS FOLLOWING YOUR PROCEDURE.
Subjective:      Patient ID: Pinky Ahmadi is a 79 y.o. female. HPI: acute for neck pain    Chief Complaint   Patient presents with    Neck Pain     x 3-4 months       Onset of 3-4 months with neck pain. Shooting pain up into head. Neck stiffness. Known DDD in spine. Unable to take NSAID due to stomach issues. Chronic arthritis pain. Bilateral shoulder pain. On Norco  mg 6 times per day from PCP. Pain severe when pain medication wearing off    Seen Laser Therapy for her pain with no benefit. Has not seen PAIN CLINIC. Patient Active Problem List   Diagnosis    GERD (gastroesophageal reflux disease)    Raynaud phenomenon    Osteoarthrosis involving multiple sites, kenna wt bearing joints.  Medication monitoring encounter    Tinnitus, bilateral    Arthralgia of multiple joints, chronic pain.  Hip pain, left, s/p THR.  S/P total hip arthroplasty, right.  S/P TKR (total knee replacement), bilateral.    SK (seborrheic keratosis), facial    Pedal edema    Allergic rhinitis    Fibromyalgia syndrome    Obesity (BMI 30-39. 9)    History of left hip replacement, 9/14/16.  Rotator cuff tear arthropathy of both shoulders    Iron deficiency anemia secondary to inadequate dietary iron intake    Chronic fatigue    Cerumen debris on tympanic membrane of right ear    Incomplete bladder emptying         Review of Systems   Constitutional: Negative for chills and fever. HENT: Negative. Respiratory: Negative for cough and shortness of breath. Cardiovascular: Negative for chest pain. Gastrointestinal: Negative for abdominal pain and nausea. Musculoskeletal: Positive for arthralgias, neck pain and neck stiffness. Skin: Negative for rash. Neurological: Negative for dizziness, light-headedness and headaches. Psychiatric/Behavioral: Negative. Objective:   Physical Exam  Constitutional:       General: She is not in acute distress.   Eyes:      Pupils: Pupils
Statement Selected

## 2023-01-26 NOTE — BRIEF OPERATIVE NOTE - OPERATION/FINDINGS
right renal pelvic biopsied  6x24 right ureteral stent placed without a string procedure: cystoscopy,  right ureteroscopy, retrograde pyelogram, biopsy of renal pelvis mass, fulguration, right stent placement   right renal pelvic biopsied  6x24 right ureteral stent placed without a string procedure: cystoscopy,  right ureteroscopy, retrograde pyelogram, right renal pelvic mass biopsy, fulguration, right stent placement   6x24 right ureteral stent placed without a string  see full operative report

## 2023-01-26 NOTE — BRIEF OPERATIVE NOTE - NSICDXBRIEFPROCEDURE_GEN_ALL_CORE_FT
PROCEDURES:  Cystoscopy with right ureteroscopy with biopsy 26-Jan-2023 10:02:22  Karley Duran   PROCEDURES:  Cystoscopy with right ureteroscopy with biopsy 26-Jan-2023 10:02:22  Karley Duran  Cystoscopy with replacement of right ureteral stent 26-Jan-2023 10:09:04  Karley Duran

## 2023-01-31 LAB
NON-GYNECOLOGICAL CYTOLOGY STUDY: SIGNIFICANT CHANGE UP
SURGICAL PATHOLOGY STUDY: SIGNIFICANT CHANGE UP

## 2023-02-03 ENCOUNTER — OUTPATIENT (OUTPATIENT)
Dept: OUTPATIENT SERVICES | Facility: HOSPITAL | Age: 73
LOS: 1 days | End: 2023-02-03
Payer: MEDICAID

## 2023-02-03 ENCOUNTER — APPOINTMENT (OUTPATIENT)
Dept: UROLOGY | Facility: CLINIC | Age: 73
End: 2023-02-03
Payer: MEDICAID

## 2023-02-03 VITALS
HEART RATE: 84 BPM | OXYGEN SATURATION: 100 % | SYSTOLIC BLOOD PRESSURE: 163 MMHG | DIASTOLIC BLOOD PRESSURE: 89 MMHG | HEIGHT: 62 IN | WEIGHT: 162 LBS | TEMPERATURE: 97.3 F | BODY MASS INDEX: 29.81 KG/M2

## 2023-02-03 DIAGNOSIS — Z00.00 ENCOUNTER FOR GENERAL ADULT MEDICAL EXAMINATION WITHOUT ABNORMAL FINDINGS: ICD-10-CM

## 2023-02-03 DIAGNOSIS — E11.9 TYPE 2 DIABETES MELLITUS W/OUT COMPLICATIONS: ICD-10-CM

## 2023-02-03 DIAGNOSIS — Z98.890 OTHER SPECIFIED POSTPROCEDURAL STATES: Chronic | ICD-10-CM

## 2023-02-03 DIAGNOSIS — Z90.710 ACQUIRED ABSENCE OF BOTH CERVIX AND UTERUS: Chronic | ICD-10-CM

## 2023-02-03 PROCEDURE — 52310 CYSTOSCOPY AND TREATMENT: CPT

## 2023-02-03 PROCEDURE — 99214 OFFICE O/P EST MOD 30 MIN: CPT | Mod: 25

## 2023-02-03 PROCEDURE — G0463: CPT

## 2023-02-03 RX ORDER — TAMSULOSIN HYDROCHLORIDE 0.4 MG/1
0.4 CAPSULE ORAL
Refills: 0 | Status: COMPLETED | COMMUNITY
End: 2023-02-03

## 2023-02-03 RX ORDER — HYDROCHLOROTHIAZIDE 25 MG/1
25 TABLET ORAL
Refills: 0 | Status: ACTIVE | COMMUNITY

## 2023-02-03 RX ORDER — OXYCODONE HYDROCHLORIDE AND ACETAMINOPHEN 5; 325 MG/1; MG/1
5-325 TABLET ORAL
Refills: 0 | Status: COMPLETED | COMMUNITY
End: 2023-02-03

## 2023-02-03 RX ORDER — SENNOSIDES 8.6 MG TABLETS 8.6 MG/1
8.6 TABLET ORAL
Refills: 0 | Status: COMPLETED | COMMUNITY
End: 2023-02-03

## 2023-02-03 RX ORDER — LORATADINE 5 MG
17 TABLET,CHEWABLE ORAL
Refills: 0 | Status: COMPLETED | COMMUNITY
End: 2023-02-03

## 2023-02-03 NOTE — HISTORY OF PRESENT ILLNESS
[FreeTextEntry1] : 71 yo Taiwanese speaking female is accompanied by her son and presents for post-op follow up and right ureteral stent removal.\par \par On 1/26/2023 patient underwent cystoscopy, right ureteroscopy with right renal pelvic biopsy and fulguration, and right ureteral stent placement. Reviewed right renal pelvic biopsy and right kidney urine pathology with patient and son--benign renal parenchyma with focal interstitial chronic inflammation and negative for high grade urothelial carcinoma, respectively. \par \par Per patient, no longer having gross hematuria. Has ureteral stent discomfort but tolerable. \par

## 2023-02-03 NOTE — ASSESSMENT
[FreeTextEntry1] : 73 yo female with gross hematuria.\par \par - Reviewed CT imaging\par - Reviewed pathology with patient and son demonstrating benign renal parenchyma with focal interstitial chronic inflammation\par - Cysto + right ureteral stent removal today.  Patient tolerated well\par - Repeat U/A and urine cytology in 3 months\par \par Follow up in 3 months

## 2023-02-06 DIAGNOSIS — E11.9 TYPE 2 DIABETES MELLITUS WITHOUT COMPLICATIONS: ICD-10-CM

## 2023-02-06 DIAGNOSIS — R31.0 GROSS HEMATURIA: ICD-10-CM

## 2023-02-14 ENCOUNTER — APPOINTMENT (OUTPATIENT)
Dept: UROLOGY | Facility: CLINIC | Age: 73
End: 2023-02-14

## 2023-05-05 ENCOUNTER — APPOINTMENT (OUTPATIENT)
Dept: UROLOGY | Facility: CLINIC | Age: 73
End: 2023-05-05
Payer: MEDICAID

## 2023-05-05 ENCOUNTER — OUTPATIENT (OUTPATIENT)
Dept: OUTPATIENT SERVICES | Facility: HOSPITAL | Age: 73
LOS: 1 days | End: 2023-05-05
Payer: MEDICAID

## 2023-05-05 VITALS
SYSTOLIC BLOOD PRESSURE: 121 MMHG | TEMPERATURE: 97.3 F | OXYGEN SATURATION: 100 % | DIASTOLIC BLOOD PRESSURE: 77 MMHG | HEART RATE: 78 BPM | HEIGHT: 62 IN | RESPIRATION RATE: 18 BRPM | BODY MASS INDEX: 29.81 KG/M2 | WEIGHT: 162 LBS

## 2023-05-05 DIAGNOSIS — Z98.890 OTHER SPECIFIED POSTPROCEDURAL STATES: Chronic | ICD-10-CM

## 2023-05-05 DIAGNOSIS — Z90.710 ACQUIRED ABSENCE OF BOTH CERVIX AND UTERUS: Chronic | ICD-10-CM

## 2023-05-05 DIAGNOSIS — R93.421 ABNORMAL RADIOLOGIC FINDINGS ON DIAGNOSTIC IMAGING OF RIGHT KIDNEY: ICD-10-CM

## 2023-05-05 DIAGNOSIS — Z00.00 ENCOUNTER FOR GENERAL ADULT MEDICAL EXAMINATION WITHOUT ABNORMAL FINDINGS: ICD-10-CM

## 2023-05-05 PROCEDURE — 88112 CYTOPATH CELL ENHANCE TECH: CPT | Mod: 26

## 2023-05-05 PROCEDURE — 99214 OFFICE O/P EST MOD 30 MIN: CPT

## 2023-05-05 PROCEDURE — G0463: CPT

## 2023-05-05 NOTE — HISTORY OF PRESENT ILLNESS
[FreeTextEntry1] : 71 yo Dominican speaking female is accompanied by her son and presents for follow up.\par \par Patient had an episode of gross hematuria with CT showing filling defect within R renal pelvis. She underwent cystoscopy, R URS, renal pelvis biopsy (benign renal parenchyma with focal interstitial chronic inflammation) as well as kidney urine cytology (negative for high grade urothelial carcinoma) on January 26, 2023. R stent removed in office on February 3.\par \par Patient presents today for follow up urinalysis and urine cytology. Denies history of gross hematuria since surgery, as well as flank pain, fevers, chills, dysuria. Reports frequency since yesterday with no other associated symptoms.

## 2023-05-05 NOTE — ASSESSMENT
[FreeTextEntry1] : 73 yo female with gross hematuria with URS and renal pelvis biopsy showing benign parenchyma with focal interstitial chronic inflammation, new complaint of increased urinary frequency\par \par -CT images again reviewed demonstrating an apparent filling defect in the right kidney\par - Reviewed pathology again with patient and son demonstrating benign renal parenchyma with focal interstitial chronic inflammation\par - U/A\par - UCx\par - urine cytology\par - Follow up in 6 months for renal ultrasound if urine studies are unremarkable

## 2023-05-08 LAB
APPEARANCE: CLEAR
BACTERIA UR CULT: NORMAL
BACTERIA: NEGATIVE /HPF
BILIRUBIN URINE: NEGATIVE
BLOOD URINE: NEGATIVE
CAST: 0 /LPF
COLOR: YELLOW
EPITHELIAL CELLS: 0 /HPF
GLUCOSE QUALITATIVE U: NEGATIVE MG/DL
KETONES URINE: NEGATIVE MG/DL
LEUKOCYTE ESTERASE URINE: NEGATIVE
MICROSCOPIC-UA: NORMAL
NITRITE URINE: NEGATIVE
PH URINE: 7.5
PROTEIN URINE: NEGATIVE MG/DL
RED BLOOD CELLS URINE: 0 /HPF
SPECIFIC GRAVITY URINE: 1.01
UROBILINOGEN URINE: 0.2 MG/DL
WHITE BLOOD CELLS URINE: 0 /HPF

## 2023-05-15 LAB — URINE CYTOLOGY: NORMAL

## 2023-11-03 ENCOUNTER — APPOINTMENT (OUTPATIENT)
Dept: UROLOGY | Facility: CLINIC | Age: 73
End: 2023-11-03
Payer: MEDICAID

## 2023-11-03 ENCOUNTER — OUTPATIENT (OUTPATIENT)
Dept: OUTPATIENT SERVICES | Facility: HOSPITAL | Age: 73
LOS: 1 days | End: 2023-11-03
Payer: MEDICAID

## 2023-11-03 VITALS
OXYGEN SATURATION: 100 % | HEART RATE: 74 BPM | BODY MASS INDEX: 30 KG/M2 | HEIGHT: 62 IN | SYSTOLIC BLOOD PRESSURE: 139 MMHG | DIASTOLIC BLOOD PRESSURE: 71 MMHG | WEIGHT: 163 LBS | TEMPERATURE: 97.2 F

## 2023-11-03 DIAGNOSIS — Z90.710 ACQUIRED ABSENCE OF BOTH CERVIX AND UTERUS: Chronic | ICD-10-CM

## 2023-11-03 DIAGNOSIS — Z00.00 ENCOUNTER FOR GENERAL ADULT MEDICAL EXAMINATION WITHOUT ABNORMAL FINDINGS: ICD-10-CM

## 2023-11-03 PROCEDURE — G0463: CPT

## 2023-11-03 PROCEDURE — 99213 OFFICE O/P EST LOW 20 MIN: CPT

## 2023-11-06 DIAGNOSIS — R31.0 GROSS HEMATURIA: ICD-10-CM

## 2023-11-06 LAB
APPEARANCE: CLEAR
BACTERIA UR CULT: NORMAL
BACTERIA: NEGATIVE /HPF
BILIRUBIN URINE: NEGATIVE
BLOOD URINE: NEGATIVE
CAST: 0 /LPF
COLOR: YELLOW
EPITHELIAL CELLS: 3 /HPF
GLUCOSE QUALITATIVE U: NEGATIVE MG/DL
KETONES URINE: NEGATIVE MG/DL
LEUKOCYTE ESTERASE URINE: ABNORMAL
MICROSCOPIC-UA: NORMAL
NITRITE URINE: NEGATIVE
PH URINE: 6.5
PROTEIN URINE: NEGATIVE MG/DL
RED BLOOD CELLS URINE: 0 /HPF
REVIEW: NORMAL
SPECIFIC GRAVITY URINE: 1.01
UROBILINOGEN URINE: 0.2 MG/DL
WHITE BLOOD CELLS URINE: 1 /HPF

## 2023-11-07 LAB — URINE CYTOLOGY: NORMAL

## 2023-11-10 ENCOUNTER — OUTPATIENT (OUTPATIENT)
Dept: OUTPATIENT SERVICES | Facility: HOSPITAL | Age: 73
LOS: 1 days | End: 2023-11-10
Payer: MEDICAID

## 2023-11-10 ENCOUNTER — APPOINTMENT (OUTPATIENT)
Dept: ULTRASOUND IMAGING | Facility: CLINIC | Age: 73
End: 2023-11-10
Payer: MEDICAID

## 2023-11-10 ENCOUNTER — APPOINTMENT (OUTPATIENT)
Dept: UROLOGY | Facility: CLINIC | Age: 73
End: 2023-11-10

## 2023-11-10 VITALS
TEMPERATURE: 97.7 F | DIASTOLIC BLOOD PRESSURE: 75 MMHG | SYSTOLIC BLOOD PRESSURE: 111 MMHG | OXYGEN SATURATION: 97 % | WEIGHT: 162 LBS | HEIGHT: 62 IN | HEART RATE: 91 BPM | BODY MASS INDEX: 29.81 KG/M2

## 2023-11-10 DIAGNOSIS — Z00.00 ENCOUNTER FOR GENERAL ADULT MEDICAL EXAMINATION WITHOUT ABNORMAL FINDINGS: ICD-10-CM

## 2023-11-10 DIAGNOSIS — Z98.890 OTHER SPECIFIED POSTPROCEDURAL STATES: Chronic | ICD-10-CM

## 2023-11-10 PROCEDURE — G0463: CPT

## 2023-11-10 PROCEDURE — 76770 US EXAM ABDO BACK WALL COMP: CPT

## 2023-11-11 ENCOUNTER — EMERGENCY (EMERGENCY)
Facility: HOSPITAL | Age: 73
LOS: 1 days | Discharge: HOME | End: 2023-11-11
Attending: EMERGENCY MEDICINE
Payer: MEDICAID

## 2023-11-11 VITALS
HEART RATE: 76 BPM | SYSTOLIC BLOOD PRESSURE: 147 MMHG | DIASTOLIC BLOOD PRESSURE: 73 MMHG | RESPIRATION RATE: 19 BRPM | OXYGEN SATURATION: 100 % | TEMPERATURE: 99 F

## 2023-11-11 VITALS
TEMPERATURE: 99 F | HEART RATE: 77 BPM | SYSTOLIC BLOOD PRESSURE: 149 MMHG | OXYGEN SATURATION: 99 % | WEIGHT: 162.04 LBS | HEIGHT: 62 IN | RESPIRATION RATE: 20 BRPM | DIASTOLIC BLOOD PRESSURE: 85 MMHG

## 2023-11-11 DIAGNOSIS — Z98.890 OTHER SPECIFIED POSTPROCEDURAL STATES: Chronic | ICD-10-CM

## 2023-11-11 DIAGNOSIS — Z90.710 ACQUIRED ABSENCE OF BOTH CERVIX AND UTERUS: Chronic | ICD-10-CM

## 2023-11-11 LAB
ALBUMIN SERPL ELPH-MCNC: 4.4 G/DL — SIGNIFICANT CHANGE UP (ref 3.3–5)
ALBUMIN SERPL ELPH-MCNC: 4.4 G/DL — SIGNIFICANT CHANGE UP (ref 3.3–5)
ALP SERPL-CCNC: 60 U/L — SIGNIFICANT CHANGE UP (ref 40–120)
ALP SERPL-CCNC: 60 U/L — SIGNIFICANT CHANGE UP (ref 40–120)
ALT FLD-CCNC: 20 U/L — SIGNIFICANT CHANGE UP (ref 10–45)
ALT FLD-CCNC: 20 U/L — SIGNIFICANT CHANGE UP (ref 10–45)
ANION GAP SERPL CALC-SCNC: 13 MMOL/L — SIGNIFICANT CHANGE UP (ref 5–17)
ANION GAP SERPL CALC-SCNC: 13 MMOL/L — SIGNIFICANT CHANGE UP (ref 5–17)
APPEARANCE UR: CLEAR — SIGNIFICANT CHANGE UP
APPEARANCE UR: CLEAR — SIGNIFICANT CHANGE UP
AST SERPL-CCNC: 20 U/L — SIGNIFICANT CHANGE UP (ref 10–40)
AST SERPL-CCNC: 20 U/L — SIGNIFICANT CHANGE UP (ref 10–40)
BASOPHILS # BLD AUTO: 0.02 K/UL — SIGNIFICANT CHANGE UP (ref 0–0.2)
BASOPHILS # BLD AUTO: 0.02 K/UL — SIGNIFICANT CHANGE UP (ref 0–0.2)
BASOPHILS NFR BLD AUTO: 0.5 % — SIGNIFICANT CHANGE UP (ref 0–2)
BASOPHILS NFR BLD AUTO: 0.5 % — SIGNIFICANT CHANGE UP (ref 0–2)
BILIRUB SERPL-MCNC: 0.3 MG/DL — SIGNIFICANT CHANGE UP (ref 0.2–1.2)
BILIRUB SERPL-MCNC: 0.3 MG/DL — SIGNIFICANT CHANGE UP (ref 0.2–1.2)
BILIRUB UR-MCNC: NEGATIVE — SIGNIFICANT CHANGE UP
BILIRUB UR-MCNC: NEGATIVE — SIGNIFICANT CHANGE UP
BUN SERPL-MCNC: 21 MG/DL — SIGNIFICANT CHANGE UP (ref 7–23)
BUN SERPL-MCNC: 21 MG/DL — SIGNIFICANT CHANGE UP (ref 7–23)
CALCIUM SERPL-MCNC: 9.3 MG/DL — SIGNIFICANT CHANGE UP (ref 8.4–10.5)
CALCIUM SERPL-MCNC: 9.3 MG/DL — SIGNIFICANT CHANGE UP (ref 8.4–10.5)
CHLORIDE SERPL-SCNC: 105 MMOL/L — SIGNIFICANT CHANGE UP (ref 96–108)
CHLORIDE SERPL-SCNC: 105 MMOL/L — SIGNIFICANT CHANGE UP (ref 96–108)
CO2 SERPL-SCNC: 23 MMOL/L — SIGNIFICANT CHANGE UP (ref 22–31)
CO2 SERPL-SCNC: 23 MMOL/L — SIGNIFICANT CHANGE UP (ref 22–31)
COLOR SPEC: YELLOW — SIGNIFICANT CHANGE UP
COLOR SPEC: YELLOW — SIGNIFICANT CHANGE UP
CREAT SERPL-MCNC: 0.89 MG/DL — SIGNIFICANT CHANGE UP (ref 0.5–1.3)
CREAT SERPL-MCNC: 0.89 MG/DL — SIGNIFICANT CHANGE UP (ref 0.5–1.3)
DIFF PNL FLD: NEGATIVE — SIGNIFICANT CHANGE UP
DIFF PNL FLD: NEGATIVE — SIGNIFICANT CHANGE UP
EGFR: 68 ML/MIN/1.73M2 — SIGNIFICANT CHANGE UP
EGFR: 68 ML/MIN/1.73M2 — SIGNIFICANT CHANGE UP
EOSINOPHIL # BLD AUTO: 0.13 K/UL — SIGNIFICANT CHANGE UP (ref 0–0.5)
EOSINOPHIL # BLD AUTO: 0.13 K/UL — SIGNIFICANT CHANGE UP (ref 0–0.5)
EOSINOPHIL NFR BLD AUTO: 2.9 % — SIGNIFICANT CHANGE UP (ref 0–6)
EOSINOPHIL NFR BLD AUTO: 2.9 % — SIGNIFICANT CHANGE UP (ref 0–6)
GLUCOSE SERPL-MCNC: 99 MG/DL — SIGNIFICANT CHANGE UP (ref 70–99)
GLUCOSE SERPL-MCNC: 99 MG/DL — SIGNIFICANT CHANGE UP (ref 70–99)
GLUCOSE UR QL: NEGATIVE MG/DL — SIGNIFICANT CHANGE UP
GLUCOSE UR QL: NEGATIVE MG/DL — SIGNIFICANT CHANGE UP
HCT VFR BLD CALC: 40.8 % — SIGNIFICANT CHANGE UP (ref 34.5–45)
HCT VFR BLD CALC: 40.8 % — SIGNIFICANT CHANGE UP (ref 34.5–45)
HGB BLD-MCNC: 12.5 G/DL — SIGNIFICANT CHANGE UP (ref 11.5–15.5)
HGB BLD-MCNC: 12.5 G/DL — SIGNIFICANT CHANGE UP (ref 11.5–15.5)
IMM GRANULOCYTES NFR BLD AUTO: 0.2 % — SIGNIFICANT CHANGE UP (ref 0–0.9)
IMM GRANULOCYTES NFR BLD AUTO: 0.2 % — SIGNIFICANT CHANGE UP (ref 0–0.9)
KETONES UR-MCNC: NEGATIVE MG/DL — SIGNIFICANT CHANGE UP
KETONES UR-MCNC: NEGATIVE MG/DL — SIGNIFICANT CHANGE UP
LEUKOCYTE ESTERASE UR-ACNC: NEGATIVE — SIGNIFICANT CHANGE UP
LEUKOCYTE ESTERASE UR-ACNC: NEGATIVE — SIGNIFICANT CHANGE UP
LYMPHOCYTES # BLD AUTO: 1.74 K/UL — SIGNIFICANT CHANGE UP (ref 1–3.3)
LYMPHOCYTES # BLD AUTO: 1.74 K/UL — SIGNIFICANT CHANGE UP (ref 1–3.3)
LYMPHOCYTES # BLD AUTO: 39.4 % — SIGNIFICANT CHANGE UP (ref 13–44)
LYMPHOCYTES # BLD AUTO: 39.4 % — SIGNIFICANT CHANGE UP (ref 13–44)
MCHC RBC-ENTMCNC: 24.2 PG — LOW (ref 27–34)
MCHC RBC-ENTMCNC: 24.2 PG — LOW (ref 27–34)
MCHC RBC-ENTMCNC: 30.6 GM/DL — LOW (ref 32–36)
MCHC RBC-ENTMCNC: 30.6 GM/DL — LOW (ref 32–36)
MCV RBC AUTO: 79.1 FL — LOW (ref 80–100)
MCV RBC AUTO: 79.1 FL — LOW (ref 80–100)
MONOCYTES # BLD AUTO: 0.33 K/UL — SIGNIFICANT CHANGE UP (ref 0–0.9)
MONOCYTES # BLD AUTO: 0.33 K/UL — SIGNIFICANT CHANGE UP (ref 0–0.9)
MONOCYTES NFR BLD AUTO: 7.5 % — SIGNIFICANT CHANGE UP (ref 2–14)
MONOCYTES NFR BLD AUTO: 7.5 % — SIGNIFICANT CHANGE UP (ref 2–14)
NEUTROPHILS # BLD AUTO: 2.19 K/UL — SIGNIFICANT CHANGE UP (ref 1.8–7.4)
NEUTROPHILS # BLD AUTO: 2.19 K/UL — SIGNIFICANT CHANGE UP (ref 1.8–7.4)
NEUTROPHILS NFR BLD AUTO: 49.5 % — SIGNIFICANT CHANGE UP (ref 43–77)
NEUTROPHILS NFR BLD AUTO: 49.5 % — SIGNIFICANT CHANGE UP (ref 43–77)
NITRITE UR-MCNC: NEGATIVE — SIGNIFICANT CHANGE UP
NITRITE UR-MCNC: NEGATIVE — SIGNIFICANT CHANGE UP
NRBC # BLD: 0 /100 WBCS — SIGNIFICANT CHANGE UP (ref 0–0)
NRBC # BLD: 0 /100 WBCS — SIGNIFICANT CHANGE UP (ref 0–0)
PH UR: 7 — SIGNIFICANT CHANGE UP (ref 5–8)
PH UR: 7 — SIGNIFICANT CHANGE UP (ref 5–8)
PLATELET # BLD AUTO: 179 K/UL — SIGNIFICANT CHANGE UP (ref 150–400)
PLATELET # BLD AUTO: 179 K/UL — SIGNIFICANT CHANGE UP (ref 150–400)
POTASSIUM SERPL-MCNC: 4.5 MMOL/L — SIGNIFICANT CHANGE UP (ref 3.5–5.3)
POTASSIUM SERPL-MCNC: 4.5 MMOL/L — SIGNIFICANT CHANGE UP (ref 3.5–5.3)
POTASSIUM SERPL-SCNC: 4.5 MMOL/L — SIGNIFICANT CHANGE UP (ref 3.5–5.3)
POTASSIUM SERPL-SCNC: 4.5 MMOL/L — SIGNIFICANT CHANGE UP (ref 3.5–5.3)
PROT SERPL-MCNC: 6.7 G/DL — SIGNIFICANT CHANGE UP (ref 6–8.3)
PROT SERPL-MCNC: 6.7 G/DL — SIGNIFICANT CHANGE UP (ref 6–8.3)
PROT UR-MCNC: NEGATIVE MG/DL — SIGNIFICANT CHANGE UP
PROT UR-MCNC: NEGATIVE MG/DL — SIGNIFICANT CHANGE UP
RBC # BLD: 5.16 M/UL — SIGNIFICANT CHANGE UP (ref 3.8–5.2)
RBC # BLD: 5.16 M/UL — SIGNIFICANT CHANGE UP (ref 3.8–5.2)
RBC # FLD: 13.3 % — SIGNIFICANT CHANGE UP (ref 10.3–14.5)
RBC # FLD: 13.3 % — SIGNIFICANT CHANGE UP (ref 10.3–14.5)
SODIUM SERPL-SCNC: 141 MMOL/L — SIGNIFICANT CHANGE UP (ref 135–145)
SODIUM SERPL-SCNC: 141 MMOL/L — SIGNIFICANT CHANGE UP (ref 135–145)
SP GR SPEC: 1.01 — SIGNIFICANT CHANGE UP (ref 1–1.03)
SP GR SPEC: 1.01 — SIGNIFICANT CHANGE UP (ref 1–1.03)
UROBILINOGEN FLD QL: 0.2 MG/DL — SIGNIFICANT CHANGE UP (ref 0.2–1)
UROBILINOGEN FLD QL: 0.2 MG/DL — SIGNIFICANT CHANGE UP (ref 0.2–1)
WBC # BLD: 4.42 K/UL — SIGNIFICANT CHANGE UP (ref 3.8–10.5)
WBC # BLD: 4.42 K/UL — SIGNIFICANT CHANGE UP (ref 3.8–10.5)
WBC # FLD AUTO: 4.42 K/UL — SIGNIFICANT CHANGE UP (ref 3.8–10.5)
WBC # FLD AUTO: 4.42 K/UL — SIGNIFICANT CHANGE UP (ref 3.8–10.5)

## 2023-11-11 PROCEDURE — 99284 EMERGENCY DEPT VISIT MOD MDM: CPT

## 2023-11-11 PROCEDURE — 96375 TX/PRO/DX INJ NEW DRUG ADDON: CPT

## 2023-11-11 PROCEDURE — 81003 URINALYSIS AUTO W/O SCOPE: CPT

## 2023-11-11 PROCEDURE — 87086 URINE CULTURE/COLONY COUNT: CPT

## 2023-11-11 PROCEDURE — 96374 THER/PROPH/DIAG INJ IV PUSH: CPT

## 2023-11-11 PROCEDURE — 99284 EMERGENCY DEPT VISIT MOD MDM: CPT | Mod: 25

## 2023-11-11 PROCEDURE — 80053 COMPREHEN METABOLIC PANEL: CPT

## 2023-11-11 PROCEDURE — 85025 COMPLETE CBC W/AUTO DIFF WBC: CPT

## 2023-11-11 RX ORDER — CYCLOBENZAPRINE HYDROCHLORIDE 10 MG/1
5 TABLET, FILM COATED ORAL ONCE
Refills: 0 | Status: COMPLETED | OUTPATIENT
Start: 2023-11-11 | End: 2023-11-11

## 2023-11-11 RX ORDER — ACETAMINOPHEN 500 MG
1000 TABLET ORAL ONCE
Refills: 0 | Status: COMPLETED | OUTPATIENT
Start: 2023-11-11 | End: 2023-11-11

## 2023-11-11 RX ORDER — KETOROLAC TROMETHAMINE 30 MG/ML
15 SYRINGE (ML) INJECTION ONCE
Refills: 0 | Status: DISCONTINUED | OUTPATIENT
Start: 2023-11-11 | End: 2023-11-11

## 2023-11-11 RX ORDER — LIDOCAINE 4 G/100G
1 CREAM TOPICAL ONCE
Refills: 0 | Status: COMPLETED | OUTPATIENT
Start: 2023-11-11 | End: 2023-11-11

## 2023-11-11 RX ORDER — CYCLOBENZAPRINE HYDROCHLORIDE 10 MG/1
1 TABLET, FILM COATED ORAL
Qty: 6 | Refills: 0
Start: 2023-11-11 | End: 2023-11-12

## 2023-11-11 RX ADMIN — Medication 400 MILLIGRAM(S): at 14:16

## 2023-11-11 RX ADMIN — Medication 15 MILLIGRAM(S): at 16:21

## 2023-11-11 RX ADMIN — LIDOCAINE 1 PATCH: 4 CREAM TOPICAL at 14:16

## 2023-11-11 RX ADMIN — CYCLOBENZAPRINE HYDROCHLORIDE 5 MILLIGRAM(S): 10 TABLET, FILM COATED ORAL at 16:21

## 2023-11-11 NOTE — ED PROVIDER NOTE - ATTENDING CONTRIBUTION TO CARE
Attending MD Gibson:  I personally have seen and examined this patient. I have performed a substantive portion of the visit including all aspects of the medical decision making.  Resident note reviewed and agree on plan of care and except where noted.          *The above represents an initial assessment/impression. Please refer to progress notes for potential changes in patient clinical course*

## 2023-11-11 NOTE — ED PROVIDER NOTE - CLINICAL SUMMARY MEDICAL DECISION MAKING FREE TEXT BOX
Attending MD Gibson: 73-year-old with history of right renal mass status postbiopsy of mass 1 year prior that was found to be benign, diabetes presenting for evaluation of right low back pain that radiates down the right leg for 4 days.  The pain seems to be worse with movements and standing up.  Patient denies any numbness or tingling in the right leg.  No urinary symptoms.  Specifically there is no hematuria.  Patient took Tylenol last night and she stated the pain actually worsen when she took Tylenol.  The patient is concerned because her current symptoms are similar to symptoms that she developed that led to the coverage of her right renal mass.  Patient reportedly had an outpatient ultrasound of the kidney with her urologist and they were told "everything is fine".    Patient's vital signs are nonactionable.  Patient is sitting in stretcher in no apparent distress.  Exam with right lumbar paraspinal muscle tenderness to palpation, no midline spinal tenderness.  Straight leg test appears positive at approximately 45 degrees.  5/5 strength in the bilateral upper and lower extremities.  2+ patellar reflexes, 2+ Achilles reflexes bilaterally.  Sensation intact to light touch throughout peripheral pulses full and equal bilateral upper and lower extremities.  The abdomen is nontender.    Patient is presenting for evaluation of 4 days of right low back pain that radiates down right leg.  Exam and history are suggestive of more likely lumbar radiculopathy rather than renal pathology.  I reviewed ultrasound images from yesterday's office ultrasound, there is no formal read of the ultrasound however I do not see any obvious hydronephrosis.  If lab work and urinalysis are nonactionable, will treat presentation as likely lumbar radiculopathy.  No red flag signs or symptoms to suggest cauda equina syndrome or serious spinal pathology.

## 2023-11-11 NOTE — ED PROVIDER NOTE - PATIENT PORTAL LINK FT
You can access the FollowMyHealth Patient Portal offered by [facility] by registering at the following website: [email or web address]. By joining [facility]’s FollowMyHealth portal, you will also be able to view your health information using other applications (apps) compatible with our system.

## 2023-11-11 NOTE — ED ADULT NURSE NOTE - NSFALLRISKINTERV_ED_ALL_ED

## 2023-11-11 NOTE — ED PROVIDER NOTE - NSFOLLOWUPINSTRUCTIONS_ED_ALL_ED_FT
You were seen in the emergency department for back pain. We have evaluated you and determined that you do not require further hospital interventions.    During your stay you had the following relevant results: Lab test that show normal function of your kidneys and liver    Please follow up with a SPINE DOCTOR to discuss the results of your stay in our department.  Someone will call you at the beginning of next week to help you schedule an appointment.    You are being sent home with one or more prescription medications.  The dosing, frequency, and pharmacy information are included in this discharge paperwork.  Please take each one as instructed.    You may continue to experience pain after being discharged.  For your pain, you can take 2 tablets (1000 mg) of acetaminophen (brand name Tylenol®) every 6 hours.  If you still have pain, you can also take 2 tablets (400 mg) of ibuprofen (brand names Motrin® or Advil®) every 6 hours.  For better pain control, it is recommended that you alternate these medications every 3 hours.  You should not take more than 4 doses of each medication in a 24-hour period.    If you start to experience worsening symptoms such as difficulty walking, inability to control your bladder or bowels, please return to the emergency department for further evaluation.

## 2023-11-11 NOTE — ED PROVIDER NOTE - PHYSICAL EXAMINATION
General: alert, oriented to person, time, place  Psych: mood appropriate  Head: normocephalic; atraumatic  Eyes: conjunctivae clear bilaterally, sclerae anicteric  ENT: no nasal flaring, patent nares  Cardio: non-tachycardic; skin warm and well perfused  Resp: normal respiratory effort; no accessory muscle use  GI: no active vomiting  : no CVA tenderness  Neuro: normal sensation, moving all four extremities equally  Skin: No evidence of rash or bruising  MSK: positive straight leg raise on RIGHT  Lymph/Vasc: no LE edema

## 2023-11-11 NOTE — ED PROVIDER NOTE - OBJECTIVE STATEMENT
73-year-old female with past medical history of hypertension, dyslipidemia, diabetes, hypothyroidism, renal cysts, presents to the emergency department due to right-sided back pain which radiates down the right leg.  She states that she has had this pain before, it improved after an unknown oral pain medication as well as an injection to the back.  She denies any urinary incontinence, fecal incontinence, difficulty walking.  She has no recent injections to the back.

## 2023-11-11 NOTE — ED ADULT TRIAGE NOTE - CHIEF COMPLAINT QUOTE
R back pain radiating down to R leg x 4 days  similar pain last year and was dx with a cyst on her kidney

## 2023-11-11 NOTE — ED ADULT NURSE REASSESSMENT NOTE - NS ED NURSE REASSESS COMMENT FT1
Report taken from Jana SWAN. Patient found lying in stretcher alert and awake @ this time. She is primarily Wolof speaking and  with ID:[id number] used to facilitate interview. She is breathing spontaneously and unlabored on room air. She is alert and orientated x4 and responds appropriately. She reports pain to the Right flank that is decreasing but currently 7/10 on the pain scale. Note Lidocaine patch in place to the right lower back/flank area. Ofirmev completed and Right 20G AC is C/D/I and saline locked. Pt denies chest pain, palpitations, shortness of breath, headache, visual disturbances, numbness/tingling, fever, chills, diaphoresis,  nausea, vomiting, constipation, diarrhea, or urinary symptoms. Safety and comfort measures provided, bed locked and in lowest position, side rails up for safety. Call bell within reach. Awaiting MD Reassessment.

## 2023-11-11 NOTE — ED ADULT NURSE NOTE - OBJECTIVE STATEMENT
72 y/o female presents to ED accompanied by her son from home c/o R flank pain radiating down R leg x4 days, worsened last night. Had same pain last year with hematuria, diagnosed with benign kidney cyst. Denies current hematuria. Also denies fever, chills, abd pain, dysuria, sob, chest pain, n/v/d. A&Ox4, breathing unlabored, abd soft nontender, skin warm dry and intact, ambulatory independently but intermittent walker usage especially if pain present.

## 2023-11-12 LAB
CULTURE RESULTS: SIGNIFICANT CHANGE UP
CULTURE RESULTS: SIGNIFICANT CHANGE UP
SPECIMEN SOURCE: SIGNIFICANT CHANGE UP
SPECIMEN SOURCE: SIGNIFICANT CHANGE UP

## 2023-11-13 DIAGNOSIS — R31.0 GROSS HEMATURIA: ICD-10-CM

## 2023-11-16 ENCOUNTER — NON-APPOINTMENT (OUTPATIENT)
Age: 73
End: 2023-11-16

## 2023-11-16 ENCOUNTER — RESULT REVIEW (OUTPATIENT)
Age: 73
End: 2023-11-16

## 2023-11-17 ENCOUNTER — NON-APPOINTMENT (OUTPATIENT)
Age: 73
End: 2023-11-17

## 2023-11-20 ENCOUNTER — APPOINTMENT (OUTPATIENT)
Age: 73
End: 2023-11-20
Payer: MEDICAID

## 2023-11-20 VITALS
HEART RATE: 82 BPM | OXYGEN SATURATION: 98 % | HEIGHT: 62 IN | WEIGHT: 161 LBS | BODY MASS INDEX: 29.63 KG/M2 | DIASTOLIC BLOOD PRESSURE: 75 MMHG | SYSTOLIC BLOOD PRESSURE: 119 MMHG

## 2023-11-20 PROCEDURE — 99204 OFFICE O/P NEW MOD 45 MIN: CPT

## 2023-12-06 ENCOUNTER — APPOINTMENT (OUTPATIENT)
Dept: MRI IMAGING | Facility: CLINIC | Age: 73
End: 2023-12-06
Payer: MEDICAID

## 2023-12-06 PROCEDURE — 72148 MRI LUMBAR SPINE W/O DYE: CPT

## 2023-12-06 PROCEDURE — 74183 MRI ABD W/O CNTR FLWD CNTR: CPT

## 2023-12-06 PROCEDURE — 72197 MRI PELVIS W/O & W/DYE: CPT | Mod: 26

## 2023-12-06 PROCEDURE — A9585: CPT

## 2023-12-06 PROCEDURE — 72197 MRI PELVIS W/O & W/DYE: CPT | Mod: TC

## 2023-12-06 PROCEDURE — 74183 MRI ABD W/O CNTR FLWD CNTR: CPT | Mod: TC

## 2023-12-11 ENCOUNTER — APPOINTMENT (OUTPATIENT)
Dept: ORTHOPEDIC SURGERY | Facility: CLINIC | Age: 73
End: 2023-12-11
Payer: MEDICAID

## 2023-12-11 VITALS
HEIGHT: 62 IN | SYSTOLIC BLOOD PRESSURE: 109 MMHG | HEART RATE: 85 BPM | DIASTOLIC BLOOD PRESSURE: 70 MMHG | OXYGEN SATURATION: 99 % | BODY MASS INDEX: 29.63 KG/M2 | WEIGHT: 161 LBS

## 2023-12-11 PROCEDURE — 99214 OFFICE O/P EST MOD 30 MIN: CPT

## 2024-02-08 ENCOUNTER — APPOINTMENT (OUTPATIENT)
Dept: ORTHOPEDIC SURGERY | Facility: CLINIC | Age: 74
End: 2024-02-08
Payer: MEDICAID

## 2024-02-08 VITALS
BODY MASS INDEX: 28.71 KG/M2 | WEIGHT: 156 LBS | HEART RATE: 84 BPM | SYSTOLIC BLOOD PRESSURE: 146 MMHG | DIASTOLIC BLOOD PRESSURE: 76 MMHG | HEIGHT: 62 IN

## 2024-02-08 DIAGNOSIS — M43.16 SPONDYLOLISTHESIS, LUMBAR REGION: ICD-10-CM

## 2024-02-08 DIAGNOSIS — M48.07 SPINAL STENOSIS, LUMBOSACRAL REGION: ICD-10-CM

## 2024-02-08 PROCEDURE — 99214 OFFICE O/P EST MOD 30 MIN: CPT

## 2024-02-08 NOTE — ADDENDUM
[FreeTextEntry1] : This note was written by Kiran Cassidy on 02/08/2024 acting as scribe for Dr. Jaylen Campuzano M.D.  I, Jaylen Campuzano MD, have read and attest that all the information, medical decision making and discharge instructions within are true and accurate.

## 2024-02-08 NOTE — PHYSICAL EXAM
[Normal] : Gait: normal [Mora's Sign] : negative Mora's sign [Pronator Drift] : negative pronator drift [SLR] : negative straight leg raise [de-identified] : 5 out of 5 motor strength, sensation is intact and symmetrical full range of motion flexion extension and rotation, no palpatory tenderness full range of motion of hips knees shoulders and elbows (all four extremities), no atrophy, negative straight leg raise, no pathological reflexes, no swelling, normal ambulation, no apparent distress skin is intact, no palpable lymph nodes, no upper or lower extremity instability, alert and oriented x3 and normal mood. Normal finger-to nose test.  [de-identified] : I reviewed, interpreted and clinically correlated the following outside imaging studies, MR SPINE LUMBAR  - ORDERED BY: DANGELO ALFREDO   PROCEDURE DATE:  12/06/2023    INTERPRETATION:  CLINICAL INFORMATION: Pain in lower back.  ADDITIONAL CLINICAL INFORMATION: Not Applicable  TECHNIQUE: Multiplanar, multisequence MRI was performed of the lumbar spine. IV Contrast: NONE  PRIOR STUDIES: No priors available for comparison. Correlation with renal ultrasound 11/10/2023  FINDINGS:  LOCALIZER: No additional findings. BONES: No significant discogenic endplate bone marrow edema is present in association with disc desiccation with mild loss of disc height at L2-3 through L4-5 with severe loss of disc height at L5-S1. Levels. No acute compression fracture. ALIGNMENT: Grade 1 anterolisthesis of L4 on L5. Gentle levocurvature of the lumbar spine. SACROILIAC JOINTS/SACRUM: Partially imaged. There is no sacral fracture. The SI joints are partially visualized but are intact. CONUS AND CAUDA EQUINA: The distal cord and conus are normal in signal. Conus terminates at L1. VISUALIZED INTRAPELVIC/INTRA-ABDOMINAL SOFT TISSUES: MRI abdomen is planned to evaluate a left renal lesion, not well evaluated on this exam. Additional renal cysts are present bilaterally. PARASPINAL SOFT TISSUES: Moderate paraspinal muscle atrophy.   INDIVIDUAL LEVELS:  LOWER THORACIC SPINE: No spinal canal or neuroforaminal stenosis.  L1-L2: No spinal canal or neuroforaminal stenosis. L2-L3: Mild disc bulging with mild facet arthrosis. Mild bilateral neural foraminal stenosis. No central stenosis. L3-L4: Mild disc bulging with moderate right greater than left facet arthrosis. Mild bilateral neural foraminal stenosis. No central stenosis. L4-L5: Severe facet arthrosis with grade 1 anterolisthesis and uncovering of the posterior disc. Ligamentous hypertrophy is present. Moderate right and mild left neural foraminal stenosis. Moderate central stenosis. L5-S1: Severe disc desiccation with severe loss of disc height and mild facet arthrosis. Broad-based disc bulge with endplate bony proliferation. Moderate bilateral neural foraminal stenosis. Moderate narrowing of the right lateral recess with disc material abutting the descending right S1 nerve root.   IMPRESSION: 1. Lumbar spondylosis worst at L4-5 and L5-S1 as follows:  L4-L5: Severe facet arthrosis with grade 1 anterolisthesis and uncovering of the posterior disc. Ligamentous hypertrophy is present. Moderate right and mild left neural foraminal stenosis. Moderate central stenosis.  L5-S1: Severe disc desiccation with severe loss of disc height and mild facet arthrosis. Broad-based disc bulge with endplate bony proliferation. Moderate bilateral neural foraminal stenosis. Moderate narrowing of the right lateral recess with disc material abutting the descending right S1 nerve root.  Additional milder findings elsewhere as above.  2. MRI abdomen is planned to evaluate a left renal lesion, not well evaluated on this exam.  --- End of Report ---         XR AP Lat Lumbar 11/14/2023 ([facility]) -L4-5 spondylolisthesis- reviewed with patient.

## 2024-02-08 NOTE — DISCUSSION/SUMMARY
[de-identified] : L4-5 moderate stenosis and spondylolisthesis. Right lumbar radiculopathy. PT did not help relieve symptoms. Discussed all options. Patient referred to Dr. Orlando Georges. F/U 1 month. At this point they are not interested in any surgical intervention All options discussed including rest, medicine, home exercise, acupuncture, Chiropractic care, Physical Therapy, Pain management, and last resort surgery. All questions were answered, all alternatives discussed and the patient is in complete agreement with the treatment plan which the patient contributed to and discussed with me through the shared decision making process. Follow-up appointment as instructed. Any issues and the patient will call or come in sooner. Son agrees with plan, was present for interpretation.

## 2024-02-08 NOTE — HISTORY OF PRESENT ILLNESS
[Improving] : improving [de-identified] : 73 year female presents for evaluation of lower back pain since November 2023.  She states she had a similar pain in 2000, was diagnosed with a lumbar disc herniation. The pain radiates down the RLE laterally to the thigh, and wraps to the anterior leg down to the foot. Denies numbness/tingling. Laying on the right side aggravates the pain. Takes naproxen and has mild relief. She underwent an injection in 2000 which helped alleviate her pain at that time. Was referred to PT at last visit. She has been participating with PT twice a week in December and January but has not felt improvement. PMHx: HTN, DM No fever chills sweats nausea vomiting no bowel or bladder dysfunction, no recent weight loss or gain no night pain. This history is in addition to the intake form that I personally reviewed.

## 2024-02-23 LAB
ANION GAP SERPL CALC-SCNC: 12 MMOL/L
APPEARANCE: CLEAR
BACTERIA UR CULT: NORMAL
BACTERIA: NEGATIVE
BASOPHILS # BLD AUTO: 0.02 K/UL
BASOPHILS NFR BLD AUTO: 0.5 %
BILIRUBIN URINE: NEGATIVE
BLOOD URINE: NEGATIVE
BUN SERPL-MCNC: 20 MG/DL
CALCIUM SERPL-MCNC: 10.4 MG/DL
CHLORIDE SERPL-SCNC: 104 MMOL/L
CO2 SERPL-SCNC: 28 MMOL/L
COLOR: NORMAL
CREAT SERPL-MCNC: 1.01 MG/DL
EGFR: 59 ML/MIN/1.73M2
EOSINOPHIL # BLD AUTO: 0.11 K/UL
EOSINOPHIL NFR BLD AUTO: 2.8 %
GLUCOSE QUALITATIVE U: NEGATIVE
GLUCOSE SERPL-MCNC: 108 MG/DL
HCT VFR BLD CALC: 39 %
HGB BLD-MCNC: 12 G/DL
HYALINE CASTS: 1 /LPF
IMM GRANULOCYTES NFR BLD AUTO: 0.3 %
KETONES URINE: NEGATIVE
LEUKOCYTE ESTERASE URINE: NEGATIVE
LYMPHOCYTES # BLD AUTO: 1.63 K/UL
LYMPHOCYTES NFR BLD AUTO: 41.1 %
MAN DIFF?: NORMAL
MCHC RBC-ENTMCNC: 25.1 PG
MCHC RBC-ENTMCNC: 30.8 GM/DL
MCV RBC AUTO: 81.4 FL
MICROSCOPIC-UA: NORMAL
MONOCYTES # BLD AUTO: 0.27 K/UL
MONOCYTES NFR BLD AUTO: 6.8 %
NEUTROPHILS # BLD AUTO: 1.93 K/UL
NEUTROPHILS NFR BLD AUTO: 48.5 %
NITRITE URINE: NEGATIVE
PH URINE: 7.5
PLATELET # BLD AUTO: 146 K/UL
POTASSIUM SERPL-SCNC: 4.9 MMOL/L
PROTEIN URINE: NORMAL
RBC # BLD: 4.79 M/UL
RBC # FLD: 13.8 %
RED BLOOD CELLS URINE: 1 /HPF
SODIUM SERPL-SCNC: 145 MMOL/L
SPECIFIC GRAVITY URINE: 1.02
SQUAMOUS EPITHELIAL CELLS: 1 /HPF
UROBILINOGEN URINE: NORMAL
WBC # FLD AUTO: 3.97 K/UL
WHITE BLOOD CELLS URINE: 0 /HPF

## 2024-03-07 ENCOUNTER — APPOINTMENT (OUTPATIENT)
Dept: ORTHOPEDIC SURGERY | Facility: CLINIC | Age: 74
End: 2024-03-07
Payer: MEDICAID

## 2024-03-07 VITALS — HEIGHT: 62 IN | BODY MASS INDEX: 29.44 KG/M2 | WEIGHT: 160 LBS

## 2024-03-07 DIAGNOSIS — M54.16 RADICULOPATHY, LUMBAR REGION: ICD-10-CM

## 2024-03-07 PROCEDURE — 99214 OFFICE O/P EST MOD 30 MIN: CPT

## 2024-03-07 NOTE — PHYSICAL EXAM
[Normal] : Gait: normal [Mora's Sign] : negative Mora's sign [Pronator Drift] : negative pronator drift [SLR] : negative straight leg raise [de-identified] : 5 out of 5 motor strength, sensation is intact and symmetrical full range of motion flexion extension and rotation, no palpatory tenderness full range of motion of hips knees shoulders and elbows (all four extremities), no atrophy, negative straight leg raise, no pathological reflexes, no swelling, normal ambulation, no apparent distress skin is intact, no palpable lymph nodes, no upper or lower extremity instability, alert and oriented x3 and normal mood. Normal finger-to nose test.  [de-identified] : I reviewed, interpreted and clinically correlated the following outside imaging studies, MR SPINE LUMBAR  - ORDERED BY: DANGELO ALFREDO   PROCEDURE DATE:  12/06/2023    INTERPRETATION:  CLINICAL INFORMATION: Pain in lower back.  ADDITIONAL CLINICAL INFORMATION: Not Applicable  TECHNIQUE: Multiplanar, multisequence MRI was performed of the lumbar spine. IV Contrast: NONE  PRIOR STUDIES: No priors available for comparison. Correlation with renal ultrasound 11/10/2023  FINDINGS:  LOCALIZER: No additional findings. BONES: No significant discogenic endplate bone marrow edema is present in association with disc desiccation with mild loss of disc height at L2-3 through L4-5 with severe loss of disc height at L5-S1. Levels. No acute compression fracture. ALIGNMENT: Grade 1 anterolisthesis of L4 on L5. Gentle levocurvature of the lumbar spine. SACROILIAC JOINTS/SACRUM: Partially imaged. There is no sacral fracture. The SI joints are partially visualized but are intact. CONUS AND CAUDA EQUINA: The distal cord and conus are normal in signal. Conus terminates at L1. VISUALIZED INTRAPELVIC/INTRA-ABDOMINAL SOFT TISSUES: MRI abdomen is planned to evaluate a left renal lesion, not well evaluated on this exam. Additional renal cysts are present bilaterally. PARASPINAL SOFT TISSUES: Moderate paraspinal muscle atrophy.   INDIVIDUAL LEVELS:  LOWER THORACIC SPINE: No spinal canal or neuroforaminal stenosis.  L1-L2: No spinal canal or neuroforaminal stenosis. L2-L3: Mild disc bulging with mild facet arthrosis. Mild bilateral neural foraminal stenosis. No central stenosis. L3-L4: Mild disc bulging with moderate right greater than left facet arthrosis. Mild bilateral neural foraminal stenosis. No central stenosis. L4-L5: Severe facet arthrosis with grade 1 anterolisthesis and uncovering of the posterior disc. Ligamentous hypertrophy is present. Moderate right and mild left neural foraminal stenosis. Moderate central stenosis. L5-S1: Severe disc desiccation with severe loss of disc height and mild facet arthrosis. Broad-based disc bulge with endplate bony proliferation. Moderate bilateral neural foraminal stenosis. Moderate narrowing of the right lateral recess with disc material abutting the descending right S1 nerve root.   IMPRESSION: 1. Lumbar spondylosis worst at L4-5 and L5-S1 as follows:  L4-L5: Severe facet arthrosis with grade 1 anterolisthesis and uncovering of the posterior disc. Ligamentous hypertrophy is present. Moderate right and mild left neural foraminal stenosis. Moderate central stenosis.  L5-S1: Severe disc desiccation with severe loss of disc height and mild facet arthrosis. Broad-based disc bulge with endplate bony proliferation. Moderate bilateral neural foraminal stenosis. Moderate narrowing of the right lateral recess with disc material abutting the descending right S1 nerve root.  Additional milder findings elsewhere as above.  2. MRI abdomen is planned to evaluate a left renal lesion, not well evaluated on this exam.  --- End of Report ---         XR AP Lat Lumbar 11/14/2023 ([facility]) -L4-5 spondylolisthesis- reviewed with patient.

## 2024-03-07 NOTE — HISTORY OF PRESENT ILLNESS
[Improving] : improving [de-identified] : 73 year female presents for evaluation of lower back pain since November 2023.  She states she had a similar pain in 2000, was diagnosed with a lumbar disc herniation. The pain radiates down the RLE laterally to the thigh, and wraps to the anterior leg down to the foot. Denies numbness/tingling. Laying on the right side aggravates the pain. Takes naproxen and has mild relief. She underwent an injection in 2000 which helped alleviate her pain at that time. She has been participating with PT twice a week in December and January but has not felt improvement. Was referred to Dr. Georges but he does not take her insurance.  She is requesting a different pain management referral.  PMHx: HTN, DM No fever chills sweats nausea vomiting no bowel or bladder dysfunction, no recent weight loss or gain no night pain. This history is in addition to the intake form that I personally reviewed.

## 2024-03-07 NOTE — ADDENDUM
[FreeTextEntry1] : This note was written by Kiran Cassidy on 03/07/2024 acting as scribe for Dr. Jaylen Campuzano M.D.  I, Jaylen Campuzano MD, have read and attest that all the information, medical decision making and discharge instructions within are true and accurate.

## 2024-03-07 NOTE — DISCUSSION/SUMMARY
[de-identified] : L4-5 moderate stenosis and spondylolisthesis. Right lumbar radiculopathy. PT did not help relieve symptoms. Discussed all options. Patient referred to pain management. At this point they are not interested in any surgical intervention. All options discussed including rest, medicine, home exercise, acupuncture, Chiropractic care, Physical Therapy, Pain management, and last resort surgery. All questions were answered, all alternatives discussed and the patient is in complete agreement with the treatment plan which the patient contributed to and discussed with me through the shared decision making process. Follow-up appointment as instructed. Any issues and the patient will call or come in sooner. Son agrees with plan, was present for interpretation.

## 2024-05-10 ENCOUNTER — OUTPATIENT (OUTPATIENT)
Dept: OUTPATIENT SERVICES | Facility: HOSPITAL | Age: 74
LOS: 1 days | End: 2024-05-10
Payer: MEDICAID

## 2024-05-10 ENCOUNTER — APPOINTMENT (OUTPATIENT)
Dept: UROLOGY | Facility: CLINIC | Age: 74
End: 2024-05-10
Payer: MEDICAID

## 2024-05-10 VITALS
HEART RATE: 74 BPM | SYSTOLIC BLOOD PRESSURE: 148 MMHG | WEIGHT: 163 LBS | TEMPERATURE: 97.2 F | BODY MASS INDEX: 30 KG/M2 | OXYGEN SATURATION: 99 % | DIASTOLIC BLOOD PRESSURE: 82 MMHG | HEIGHT: 62 IN

## 2024-05-10 DIAGNOSIS — Z90.710 ACQUIRED ABSENCE OF BOTH CERVIX AND UTERUS: Chronic | ICD-10-CM

## 2024-05-10 DIAGNOSIS — Z98.890 OTHER SPECIFIED POSTPROCEDURAL STATES: Chronic | ICD-10-CM

## 2024-05-10 DIAGNOSIS — R31.0 GROSS HEMATURIA: ICD-10-CM

## 2024-05-10 DIAGNOSIS — Z00.00 ENCOUNTER FOR GENERAL ADULT MEDICAL EXAMINATION WITHOUT ABNORMAL FINDINGS: ICD-10-CM

## 2024-05-10 PROCEDURE — 99214 OFFICE O/P EST MOD 30 MIN: CPT

## 2024-05-10 PROCEDURE — G0463: CPT

## 2024-05-10 NOTE — HISTORY OF PRESENT ILLNESS
[FreeTextEntry1] : 72 yo Ethiopian speaking female is accompanied by her son and presents for follow up.  Patient had an episode of gross hematuria with CT showing filling defect within R renal pelvis. She underwent cystoscopy, R URS, renal pelvis biopsy (benign renal parenchyma with focal interstitial chronic inflammation) as well as kidney urine cytology (negative for high grade urothelial carcinoma) on January 26, 2023. R stent removed in office on February 3.  At her last appointment, renal US showed mild prominence of the intrarenal collecting system bilaterally with a hypoechoic lesion in the upper pole of the left kidney. She subsequently underwent a MR urogram which showed benign bilateral renal cysts with no solid lesions or urothelial lesions. Today, she reports she has been doing well since her last visit. Denies any urinary symptoms or gross hematuria. Denies any flank pain.

## 2024-05-10 NOTE — ASSESSMENT
[FreeTextEntry1] : 72 yo female with gross hematuria with URS and renal pelvis biopsy showing benign parenchyma with focal interstitial chronic inflammation, repeat urine studies negative from last visit MR Urogram 12/6: showed bilateral renal cysts without any evidence of solitary lesions.   Plan - obtain RBUS to assess for renal abnormalities  - urine cytology ordered  - If no concerning findings follow-up in 6 months

## 2024-05-13 DIAGNOSIS — R31.0 GROSS HEMATURIA: ICD-10-CM

## 2024-05-14 ENCOUNTER — APPOINTMENT (OUTPATIENT)
Dept: ULTRASOUND IMAGING | Facility: CLINIC | Age: 74
End: 2024-05-14
Payer: MEDICAID

## 2024-05-14 LAB — URINE CYTOLOGY: NORMAL

## 2024-05-14 PROCEDURE — 76770 US EXAM ABDO BACK WALL COMP: CPT

## 2024-05-15 ENCOUNTER — NON-APPOINTMENT (OUTPATIENT)
Age: 74
End: 2024-05-15

## 2024-05-17 ENCOUNTER — NON-APPOINTMENT (OUTPATIENT)
Age: 74
End: 2024-05-17

## 2024-08-09 NOTE — ASU PREOP CHECKLIST - TEMPERATURE IN FAHRENHEIT (DEGREES F)
Local Primary Care Physicians  [facility] Phys[facility] [phone]  JOCELYN Alan, MD Rj Butcher, MD David Dumont, MD Mendy Day, [facility]  Zully Carlson, [facility]  Tanesha Naranjo, [facility] [place] [facility] [phone]  Oscar Moe, MD Praveen Bo, NP  Alicia Ramy, [facility]  Naomi Moreno, [facility] [phone]  Tami Peralta, MD Candido Peralta, MD Clarisa Mendoza, MD Nalini Malagon, [facility] [phone]  MD Vy Bustos, MD Sara Gaffney, MD Justice Cuevas, MD David Yancey, MD Shanika Quesada, [facility] [phone]  Joaquín Guerrero, MD David Gomez, NP  Maxine Jha, NP [facility] [phone]  Pradeep \"Cecil\" Bharath, MD Neal Veliz, MD Jesus Pope, CNP  Manny Jose Luis, CNP  Maru Chacon, PA-C   [facility] [phone]  MD Sincere Andersen, MD Imelda Sarabia, CNP  Lynda Justo, [facility] [phone]  Ignacia Block, MD Lucian Tan, MD Annalee Guidry, MD Ming Felix MD Karlie Kellstrom PA-C   [facility] [phone]  MD Samia Vieyra, MD Imelda Hector MD Gregory Stephens, MD Anabelle Solis, [facility]  Debbie Ahn, [facility]  Jessa Saravia, [facility]  Ruiz Malcolm, [facility] [phone]  MD Jose Miguel Gao, MD Dada Cabrera, MD Jonel Taylor, MD  Bindu JefMD Lisset parker MD Jason Lee, MD Jennifer Smith, MD [facility] [phone]  MD Gisel Cochran, CNP  Simi Munoz, CNP  Ritika Walsh, [facility] [phone]  MD Cecil Wheatley MD Veronika Leonenko, PA-C  Nidhi Umanzor PA-C   [facility] [phone]  [facility], [facility].  Nabeel 
[phone]
